# Patient Record
Sex: MALE | Race: BLACK OR AFRICAN AMERICAN | Employment: FULL TIME | URBAN - METROPOLITAN AREA
[De-identification: names, ages, dates, MRNs, and addresses within clinical notes are randomized per-mention and may not be internally consistent; named-entity substitution may affect disease eponyms.]

---

## 2018-03-08 ENCOUNTER — HOSPITAL ENCOUNTER (EMERGENCY)
Age: 48
Discharge: HOME | End: 2018-03-08
Attending: EMERGENCY MEDICINE | Admitting: EMERGENCY MEDICINE
Payer: SELF-PAY

## 2018-03-08 ENCOUNTER — APPOINTMENT (OUTPATIENT)
Dept: GENERAL RADIOLOGY | Age: 48
End: 2018-03-08
Attending: PHYSICIAN ASSISTANT
Payer: SELF-PAY

## 2018-03-08 VITALS
OXYGEN SATURATION: 97 % | SYSTOLIC BLOOD PRESSURE: 151 MMHG | HEART RATE: 71 BPM | RESPIRATION RATE: 16 BRPM | HEIGHT: 71 IN | DIASTOLIC BLOOD PRESSURE: 90 MMHG | BODY MASS INDEX: 25.2 KG/M2 | WEIGHT: 180 LBS | TEMPERATURE: 98.4 F

## 2018-03-08 DIAGNOSIS — J20.9 ACUTE BRONCHITIS, UNSPECIFIED ORGANISM: Primary | ICD-10-CM

## 2018-03-08 PROCEDURE — 71046 X-RAY EXAM CHEST 2 VIEWS: CPT

## 2018-03-08 PROCEDURE — 74011250637 HC RX REV CODE- 250/637: Performed by: PHYSICIAN ASSISTANT

## 2018-03-08 PROCEDURE — 99284 EMERGENCY DEPT VISIT MOD MDM: CPT

## 2018-03-08 RX ORDER — PREDNISONE 5 MG/1
TABLET ORAL
Qty: 21 TAB | Refills: 0 | Status: SHIPPED | OUTPATIENT
Start: 2018-03-08 | End: 2022-10-12

## 2018-03-08 RX ORDER — AMLODIPINE BESYLATE 10 MG/1
10 TABLET ORAL DAILY
Qty: 30 TAB | Refills: 1 | Status: SHIPPED | OUTPATIENT
Start: 2018-03-08 | End: 2022-10-12

## 2018-03-08 RX ORDER — AMLODIPINE BESYLATE 5 MG/1
10 TABLET ORAL
Status: COMPLETED | OUTPATIENT
Start: 2018-03-08 | End: 2018-03-08

## 2018-03-08 RX ORDER — HYDROCHLOROTHIAZIDE 25 MG/1
25 TABLET ORAL
Status: COMPLETED | OUTPATIENT
Start: 2018-03-08 | End: 2018-03-08

## 2018-03-08 RX ORDER — GUAIFENESIN 100 MG/5ML
200 SOLUTION ORAL
Qty: 118 ML | Refills: 0 | Status: SHIPPED | OUTPATIENT
Start: 2018-03-08 | End: 2022-10-12

## 2018-03-08 RX ORDER — AZITHROMYCIN 250 MG/1
TABLET, FILM COATED ORAL
Qty: 6 TAB | Refills: 0 | Status: SHIPPED | OUTPATIENT
Start: 2018-03-08 | End: 2022-10-12

## 2018-03-08 RX ORDER — HYDROCHLOROTHIAZIDE 25 MG/1
25 TABLET ORAL DAILY
Qty: 30 TAB | Refills: 1 | Status: SHIPPED | OUTPATIENT
Start: 2018-03-08 | End: 2022-10-12

## 2018-03-08 RX ORDER — ALBUTEROL SULFATE 90 UG/1
1-2 AEROSOL, METERED RESPIRATORY (INHALATION)
Qty: 1 INHALER | Refills: 1 | Status: SHIPPED | OUTPATIENT
Start: 2018-03-08

## 2018-03-08 RX ORDER — GUAIFENESIN 100 MG/5ML
200 SOLUTION ORAL
Status: COMPLETED | OUTPATIENT
Start: 2018-03-08 | End: 2018-03-08

## 2018-03-08 RX ADMIN — GUAIFENESIN 200 MG: 100 SOLUTION ORAL at 19:03

## 2018-03-08 RX ADMIN — HYDROCHLOROTHIAZIDE 25 MG: 25 TABLET ORAL at 18:47

## 2018-03-08 RX ADMIN — AMLODIPINE BESYLATE 10 MG: 5 TABLET ORAL at 18:46

## 2018-03-08 NOTE — ED PROVIDER NOTES
EMERGENCY DEPARTMENT HISTORY AND PHYSICAL EXAM    Date: 3/8/2018  Patient Name: Larry Piper    History of Presenting Illness     Chief Complaint   Patient presents with    Cough         History Provided By: Patient      HPI: Larry Piper is a 52 y.o. male with a PMH of hypertension and tobacco abuse who presents with acute productive persistent cough x 2 weeks w/ CP on cough. +SOB and wheezing intermittently. Pt endorses \"very bad\" URI 2 weeks ago that has since resolved but the cough has continued to worsen. Denies fever, chills, n/v, sob/wheezing presently, cp at rest, headache, lightheadedness, dizziness, tingling/numbness, sore throat, ear pain, rhinorrhea, nasal congestion. No modifying factors. Pt additionally endorses he has been out of his HTN medications (norvasc and HCTZ) x 3 days. PCP: None    Current Outpatient Prescriptions   Medication Sig Dispense Refill    amLODIPine (NORVASC) 10 mg tablet Take 1 Tab by mouth daily. 30 Tab 1    hydroCHLOROthiazide (HYDRODIURIL) 25 mg tablet Take 1 Tab by mouth daily. 30 Tab 1    guaiFENesin (ROBITUSSIN) 100 mg/5 mL liquid Take 10 mL by mouth three (3) times daily as needed for Cough. 118 mL 0    albuterol (PROVENTIL HFA, VENTOLIN HFA, PROAIR HFA) 90 mcg/actuation inhaler Take 1-2 Puffs by inhalation every four (4) hours as needed for Wheezing. 1 Inhaler 1    azithromycin (ZITHROMAX) 250 mg tablet Take two tablets today then one tablet daily 6 Tab 0    predniSONE (STERAPRED) 5 mg dose pack See administration instruction per 5mg dose pack 21 Tab 0       Past History     Past Medical History:  Past Medical History:   Diagnosis Date    HX OTHER MEDICAL     Hypertension     Other ill-defined conditions(749.19)     kidney stones       Past Surgical History:  History reviewed. No pertinent surgical history. Family History:  History reviewed. No pertinent family history.     Social History:  Social History   Substance Use Topics    Smoking status: Current Every Day Smoker     Packs/day: 0.50     Years: 30.00    Smokeless tobacco: Never Used    Alcohol use No       Allergies:  No Known Allergies      Review of Systems   Review of Systems   Constitutional: Negative for activity change, chills, fatigue and fever. HENT: Negative for congestion, dental problem, drooling, ear discharge, ear pain, facial swelling, hearing loss, nosebleeds, postnasal drip, rhinorrhea, sinus pressure, sore throat, trouble swallowing and voice change. Eyes: Negative. Negative for pain and redness. Respiratory: Positive for cough, shortness of breath and wheezing. Negative for apnea, chest tightness and stridor. Cardiovascular: Positive for chest pain (only with cough). Negative for palpitations and leg swelling. Gastrointestinal: Negative. Negative for abdominal pain, constipation, diarrhea, nausea and vomiting. Genitourinary: Negative. Negative for dysuria. Musculoskeletal: Negative. Negative for myalgias. Skin: Negative. Negative for rash. Neurological: Negative for headaches. Psychiatric/Behavioral: Negative. Negative for confusion. Physical Exam     Vitals:    03/08/18 1812 03/08/18 1846 03/08/18 1847   BP: (!) 180/103 (!) 152/99 (!) 152/98   Pulse: 86 78 78   Resp: 16     Temp: 98.4 °F (36.9 °C)     SpO2: 100%     Weight: 81.6 kg (180 lb)     Height: 5' 11\" (1.803 m)       Physical Exam   Constitutional: He is oriented to person, place, and time. He appears well-developed and well-nourished. No distress. HENT:   Head: Normocephalic and atraumatic. Right Ear: Hearing, tympanic membrane, external ear and ear canal normal.   Left Ear: Hearing, tympanic membrane, external ear and ear canal normal.   Nose: Nose normal. No mucosal edema or rhinorrhea. Right sinus exhibits no maxillary sinus tenderness and no frontal sinus tenderness. Left sinus exhibits no maxillary sinus tenderness and no frontal sinus tenderness.    Mouth/Throat: Uvula is midline, oropharynx is clear and moist and mucous membranes are normal. Mucous membranes are not dry. No trismus in the jaw. No oropharyngeal exudate, posterior oropharyngeal edema, posterior oropharyngeal erythema or tonsillar abscesses. Eyes: Conjunctivae and EOM are normal. Pupils are equal, round, and reactive to light. Neck: Normal range of motion and full passive range of motion without pain. Cardiovascular: Normal rate, regular rhythm, normal heart sounds and intact distal pulses. Exam reveals no gallop and no friction rub. No murmur heard. Pulmonary/Chest: Effort normal and breath sounds normal. No accessory muscle usage. No respiratory distress. He has no decreased breath sounds. He has no wheezes. He has no rhonchi. He has no rales. He exhibits no tenderness. Persistent cough noted in room. Abdominal: Soft. There is no tenderness. Musculoskeletal: Normal range of motion. Neurological: He is alert and oriented to person, place, and time. Skin: Skin is warm, dry and intact. He is not diaphoretic. No pallor. Psychiatric: He has a normal mood and affect. His speech is normal and behavior is normal. Judgment and thought content normal.   Nursing note and vitals reviewed. Diagnostic Study Results     Labs -   No results found for this or any previous visit (from the past 12 hour(s)). Radiologic Studies -   XR CHEST PA LAT   Final Result        CT Results  (Last 48 hours)    None        CXR Results  (Last 48 hours)               03/08/18 1903  XR CHEST PA LAT Final result    Impression:  IMPRESSION: No acute intrathoracic disease. Narrative:  CLINICAL HISTORY: Bodyaches, fever    INDICATION: Cough       COMPARISON: 7/23/2015       FINDINGS:    PA and lateral views of the chest are obtained. The cardiopericardial silhouette is within normal limits. There is no pleural   effusion, pneumothorax or focal consolidation present.                    Medical Decision Making   I am the first provider for this patient. I reviewed the vital signs, available nursing notes, past medical history, past surgical history, family history and social history. Vital Signs-Reviewed the patient's vital signs. Records Reviewed: Nursing Notes and Old Medical Records    ED Course:     Disposition:    DISCHARGE NOTE:   7:34 PM      Care plan outlined and precautions discussed. Patient has no new complaints, changes, or physical findings. Results of xray were reviewed with the patient. All medications were reviewed with the patient; will d/c home with mediations below. All of pt's questions and concerns were addressed. Patient was instructed and agrees to follow up with PCP, as well as to return to the ED upon further deterioration. Patient is ready to go home. Follow-up Information     Follow up With Details Comments [address] [facility] Schedule an appointment as soon as possible for a visit in 1 week As needed, If symptoms worsen [address] [facility] [address]    [facility] Schedule an appointment as soon as possible for a visit in 1 week As needed, If symptoms worsen [place]/ [place]  [id number] [phone]          Current Discharge Medication List      START taking these medications    Details   guaiFENesin (ROBITUSSIN) 100 mg/5 mL liquid Take 10 mL by mouth three (3) times daily as needed for Cough. Qty: 118 mL, Refills: 0      albuterol (PROVENTIL HFA, VENTOLIN HFA, PROAIR HFA) 90 mcg/actuation inhaler Take 1-2 Puffs by inhalation every four (4) hours as needed for Wheezing.   Qty: 1 Inhaler, Refills: 1      azithromycin (ZITHROMAX) 250 mg tablet Take two tablets today then one tablet daily  Qty: 6 Tab, Refills: 0      predniSONE (STERAPRED) 5 mg dose pack See administration instruction per 5mg dose pack  Qty: 21 Tab, Refills: 0         CONTINUE these medications which have CHANGED    Details amLODIPine (NORVASC) 10 mg tablet Take 1 Tab by mouth daily. Qty: 30 Tab, Refills: 1      hydroCHLOROthiazide (HYDRODIURIL) 25 mg tablet Take 1 Tab by mouth daily. Qty: 30 Tab, Refills: 1             Provider Notes (Medical Decision Making):   DDx: uri, post viral cough syndrome, pna, bronchitis, htn, pleural effusion    Procedures:  Procedures        Diagnosis     Clinical Impression:   1.  Acute bronchitis, unspecified organism

## 2018-03-09 NOTE — DISCHARGE INSTRUCTIONS
Bronchitis: Care Instructions  Your Care Instructions    Bronchitis is inflammation of the bronchial tubes, which carry air to the lungs. The tubes swell and produce mucus, or phlegm. The mucus and inflamed bronchial tubes make you cough. You may have trouble breathing. Most cases of bronchitis are caused by viruses like those that cause colds. Antibiotics usually do not help and they may be harmful. Bronchitis usually develops rapidly and lasts about 2 to 3 weeks in otherwise healthy people. Follow-up care is a key part of your treatment and safety. Be sure to make and go to all appointments, and call your doctor if you are having problems. It's also a good idea to know your test results and keep a list of the medicines you take. How can you care for yourself at home? · Take all medicines exactly as prescribed. Call your doctor if you think you are having a problem with your medicine. · Get some extra rest.  · Take an over-the-counter pain medicine, such as acetaminophen (Tylenol), ibuprofen (Advil, Motrin), or naproxen (Aleve) to reduce fever and relieve body aches. Read and follow all instructions on the label. · Do not take two or more pain medicines at the same time unless the doctor told you to. Many pain medicines have acetaminophen, which is Tylenol. Too much acetaminophen (Tylenol) can be harmful. · Take an over-the-counter cough medicine that contains dextromethorphan to help quiet a dry, hacking cough so that you can sleep. Avoid cough medicines that have more than one active ingredient. Read and follow all instructions on the label. · Breathe moist air from a humidifier, hot shower, or sink filled with hot water. The heat and moisture will thin mucus so you can cough it out. · Do not smoke. Smoking can make bronchitis worse. If you need help quitting, talk to your doctor about stop-smoking programs and medicines. These can increase your chances of quitting for good.   When should you call for help? Call 911 anytime you think you may need emergency care. For example, call if:  ? · You have severe trouble breathing. ?Call your doctor now or seek immediate medical care if:  ? · You have new or worse trouble breathing. ? · You cough up dark brown or bloody mucus (sputum). ? · You have a new or higher fever. ? · You have a new rash. ? Watch closely for changes in your health, and be sure to contact your doctor if:  ? · You cough more deeply or more often, especially if you notice more mucus or a change in the color of your mucus. ? · You are not getting better as expected. Where can you learn more? Go to http://allan-colby.info/. Enter H333 in the search box to learn more about \"Bronchitis: Care Instructions. \"  Current as of: May 12, 2017  Content Version: 11.4  © 5396-7406 "[employer] Care instructions adapted under license by [employer] (which disclaims liability or warranty for this information). If you have questions about a medical condition or this instruction, always ask your healthcare professional. [address] any warranty or liability for your use of this information.

## 2019-12-30 ENCOUNTER — HOSPITAL ENCOUNTER (EMERGENCY)
Age: 49
Discharge: HOME | End: 2019-12-30
Attending: EMERGENCY MEDICINE | Admitting: EMERGENCY MEDICINE
Payer: COMMERCIAL

## 2019-12-30 VITALS
TEMPERATURE: 98.7 F | DIASTOLIC BLOOD PRESSURE: 88 MMHG | SYSTOLIC BLOOD PRESSURE: 141 MMHG | OXYGEN SATURATION: 99 % | RESPIRATION RATE: 17 BRPM | HEART RATE: 99 BPM

## 2019-12-30 DIAGNOSIS — R07.9 ACUTE CHEST PAIN: Primary | ICD-10-CM

## 2019-12-30 LAB
ALBUMIN SERPL-MCNC: 3.6 G/DL (ref 3.5–5)
ALBUMIN/GLOB SERPL: 0.8 {RATIO} (ref 1.1–2.2)
ALP SERPL-CCNC: 94 U/L (ref 45–117)
ALT SERPL-CCNC: 26 U/L (ref 12–78)
ANION GAP SERPL CALC-SCNC: 8 MMOL/L (ref 5–15)
AST SERPL-CCNC: 14 U/L (ref 15–37)
BASOPHILS # BLD: 0 K/UL (ref 0–0.1)
BASOPHILS NFR BLD: 0 % (ref 0–1)
BILIRUB SERPL-MCNC: 0.3 MG/DL (ref 0.2–1)
BUN SERPL-MCNC: 17 MG/DL (ref 6–20)
BUN/CREAT SERPL: 16 (ref 12–20)
CALCIUM SERPL-MCNC: 9.2 MG/DL (ref 8.5–10.1)
CHLORIDE SERPL-SCNC: 104 MMOL/L (ref 97–108)
CO2 SERPL-SCNC: 26 MMOL/L (ref 21–32)
COMMENT, HOLDF: NORMAL
CREAT SERPL-MCNC: 1.08 MG/DL (ref 0.7–1.3)
DIFFERENTIAL METHOD BLD: NORMAL
EOSINOPHIL # BLD: 0.2 K/UL (ref 0–0.4)
EOSINOPHIL NFR BLD: 2 % (ref 0–7)
ERYTHROCYTE [DISTWIDTH] IN BLOOD BY AUTOMATED COUNT: 13.4 % (ref 11.5–14.5)
GLOBULIN SER CALC-MCNC: 4.4 G/DL (ref 2–4)
GLUCOSE SERPL-MCNC: 148 MG/DL (ref 65–100)
HCT VFR BLD AUTO: 40.1 % (ref 36.6–50.3)
HGB BLD-MCNC: 13.4 G/DL (ref 12.1–17)
IMM GRANULOCYTES # BLD AUTO: 0 K/UL (ref 0–0.04)
IMM GRANULOCYTES NFR BLD AUTO: 0 % (ref 0–0.5)
LYMPHOCYTES # BLD: 2 K/UL (ref 0.8–3.5)
LYMPHOCYTES NFR BLD: 23 % (ref 12–49)
MCH RBC QN AUTO: 31.5 PG (ref 26–34)
MCHC RBC AUTO-ENTMCNC: 33.4 G/DL (ref 30–36.5)
MCV RBC AUTO: 94.1 FL (ref 80–99)
MONOCYTES # BLD: 0.6 K/UL (ref 0–1)
MONOCYTES NFR BLD: 7 % (ref 5–13)
NEUTS SEG # BLD: 5.9 K/UL (ref 1.8–8)
NEUTS SEG NFR BLD: 68 % (ref 32–75)
NRBC # BLD: 0 K/UL (ref 0–0.01)
NRBC BLD-RTO: 0 PER 100 WBC
PLATELET # BLD AUTO: 227 K/UL (ref 150–400)
PMV BLD AUTO: 10.9 FL (ref 8.9–12.9)
POTASSIUM SERPL-SCNC: 3.1 MMOL/L (ref 3.5–5.1)
PROT SERPL-MCNC: 8 G/DL (ref 6.4–8.2)
RBC # BLD AUTO: 4.26 M/UL (ref 4.1–5.7)
SAMPLES BEING HELD,HOLD: NORMAL
SODIUM SERPL-SCNC: 138 MMOL/L (ref 136–145)
TROPONIN I SERPL-MCNC: <0.05 NG/ML
WBC # BLD AUTO: 8.8 K/UL (ref 4.1–11.1)

## 2019-12-30 PROCEDURE — 84484 ASSAY OF TROPONIN QUANT: CPT

## 2019-12-30 PROCEDURE — 93005 ELECTROCARDIOGRAM TRACING: CPT

## 2019-12-30 PROCEDURE — 99284 EMERGENCY DEPT VISIT MOD MDM: CPT

## 2019-12-30 PROCEDURE — 36415 COLL VENOUS BLD VENIPUNCTURE: CPT

## 2019-12-30 PROCEDURE — 85025 COMPLETE CBC W/AUTO DIFF WBC: CPT

## 2019-12-30 PROCEDURE — 80053 COMPREHEN METABOLIC PANEL: CPT

## 2019-12-30 NOTE — ED TRIAGE NOTES
Denies pain at present. He has his EKG from the doctors office. He says the pain comes and goes and only lasts for a few minutes and radiate down his left arm.

## 2019-12-30 NOTE — DISCHARGE INSTRUCTIONS

## 2019-12-30 NOTE — ED PROVIDER NOTES
HPI.  Patient has hypertension and is compliant with his blood pressure medication. He does not have diabetes and is not aware of having hyperlipidemia. His grandmother had a heart attack when she was in her 62s and his mother had congestive heart failure. Patient is presently smoking 1/2 packs of cigarettes per day. He has had chest pain for several weeks. Pain lasts 1 to 2 seconds at a time. It is over a very small area left upper lateral pectoralis location. Movement, deep breaths, and food do not affect the pain. Pain is not exertional.  Patient is not coughing or having fevers. Past Medical History:   Diagnosis Date    HX OTHER MEDICAL     Hypertension     Other ill-defined conditions(433.33)     kidney stones       History reviewed. No pertinent surgical history. No family history on file.     Social History     Socioeconomic History    Marital status: SINGLE     Spouse name: Not on file    Number of children: Not on file    Years of education: Not on file    Highest education level: Not on file   Occupational History    Not on file   Social Needs    Financial resource strain: Not on file    Food insecurity:     Worry: Not on file     Inability: Not on file    Transportation needs:     Medical: Not on file     Non-medical: Not on file   Tobacco Use    Smoking status: Current Every Day Smoker     Packs/day: 0.50     Years: 30.00     Pack years: 15.00    Smokeless tobacco: Never Used   Substance and Sexual Activity    Alcohol use: No    Drug use: Yes     Frequency: 7.0 times per week     Types: Heroin    Sexual activity: Yes     Partners: Female     Birth control/protection: Condom     Comment: Yesterday   Lifestyle    Physical activity:     Days per week: Not on file     Minutes per session: Not on file    Stress: Not on file   Relationships    Social connections:     Talks on phone: Not on file     Gets together: Not on file     Attends Confucianist service: Not on file Active member of club or organization: Not on file     Attends meetings of clubs or organizations: Not on file     Relationship status: Not on file    Intimate partner violence:     Fear of current or ex partner: Not on file     Emotionally abused: Not on file     Physically abused: Not on file     Forced sexual activity: Not on file   Other Topics Concern    Not on file   Social History Narrative    Not on file         ALLERGIES: Patient has no known allergies. Review of Systems   Constitutional: Negative for activity change and appetite change. HENT: Negative for trouble swallowing. Eyes: Negative for redness. Respiratory: Negative for chest tightness and wheezing. Cardiovascular: Positive for chest pain. Negative for leg swelling. Gastrointestinal: Negative for abdominal distention and abdominal pain. Genitourinary: Positive for difficulty urinating. Musculoskeletal: Negative for back pain, gait problem and neck pain. Neurological: Negative for dizziness, weakness and numbness. Psychiatric/Behavioral: Negative for agitation and behavioral problems. The patient is nervous/anxious. Vitals:    12/30/19 1654   BP: (!) 146/94   Pulse: (!) 110   Resp: 16   Temp: 98.5 °F (36.9 °C)   SpO2: 98%            Physical Exam  Vitals signs and nursing note reviewed. Constitutional:       Appearance: He is well-developed. HENT:      Head: Normocephalic and atraumatic. Eyes:      Pupils: Pupils are equal, round, and reactive to light. Neck:      Musculoskeletal: Normal range of motion and neck supple. Cardiovascular:      Rate and Rhythm: Normal rate and regular rhythm. Heart sounds: Normal heart sounds. No murmur. No friction rub. No gallop. Pulmonary:      Effort: Pulmonary effort is normal. No respiratory distress. Breath sounds: No wheezing or rales. Abdominal:      Palpations: Abdomen is soft. Tenderness: There is no tenderness. There is no rebound. Musculoskeletal: Normal range of motion. General: No tenderness. Skin:     Findings: No erythema. Neurological:      Mental Status: He is alert. Cranial Nerves: No cranial nerve deficit. Comments: Motor; symmetric   Psychiatric:         Behavior: Behavior normal.          MDM       Procedures          ED EKG interpretation:  Rhythm: normal sinus rhythm; and regular. Rate (approx.): 95; Axis: normal; P wave: normal; QRS interval: normal ; ST/T wave: normal; in  Lead: ; Other findings: left ventricular hypertrophy. This EKG was interpreted by Shira Alegre MD,ED Provider.   6:32 PM

## 2019-12-30 NOTE — ED TRIAGE NOTES
EMS note: Patient presents from doctors office with complaints of generalized chest pain for the past 2 weeks. Patient reports weight gain over the last 2 weeks. Patient denies any significant cardiac history.  Patient reports he has had SOB on exertion in the past

## 2019-12-31 LAB
ATRIAL RATE: 96 BPM
CALCULATED P AXIS, ECG09: 65 DEGREES
CALCULATED R AXIS, ECG10: -17 DEGREES
CALCULATED T AXIS, ECG11: 27 DEGREES
DIAGNOSIS, 93000: NORMAL
P-R INTERVAL, ECG05: 190 MS
Q-T INTERVAL, ECG07: 372 MS
QRS DURATION, ECG06: 88 MS
QTC CALCULATION (BEZET), ECG08: 469 MS
VENTRICULAR RATE, ECG03: 96 BPM

## 2019-12-31 NOTE — ED NOTES
Pt given discharge instructions, patient education and follow-up information by provider. Pt states understanding - all questions answered. Pt discharged to home in private vehicle, ambulatory. Pt A&Ox4, RA, with pain controlled. PIV discontinued with tip intact.

## 2021-12-17 ENCOUNTER — APPOINTMENT (OUTPATIENT)
Dept: GENERAL RADIOLOGY | Age: 51
End: 2021-12-17
Attending: EMERGENCY MEDICINE
Payer: COMMERCIAL

## 2021-12-17 ENCOUNTER — HOSPITAL ENCOUNTER (EMERGENCY)
Age: 51
Discharge: HOME | End: 2021-12-18
Attending: EMERGENCY MEDICINE
Payer: COMMERCIAL

## 2021-12-17 DIAGNOSIS — R07.89 OTHER CHEST PAIN: Primary | ICD-10-CM

## 2021-12-17 LAB
ALBUMIN SERPL-MCNC: 3.8 G/DL (ref 3.5–5)
ALBUMIN/GLOB SERPL: 0.8 {RATIO} (ref 1.1–2.2)
ALP SERPL-CCNC: 112 U/L (ref 45–117)
ALT SERPL-CCNC: 53 U/L (ref 12–78)
ANION GAP SERPL CALC-SCNC: 10 MMOL/L (ref 5–15)
AST SERPL-CCNC: 29 U/L (ref 15–37)
BASOPHILS # BLD: 0 K/UL (ref 0–0.1)
BASOPHILS NFR BLD: 1 % (ref 0–1)
BILIRUB SERPL-MCNC: 0.3 MG/DL (ref 0.2–1)
BUN SERPL-MCNC: 25 MG/DL (ref 6–20)
BUN/CREAT SERPL: 20 (ref 12–20)
CALCIUM SERPL-MCNC: 10.1 MG/DL (ref 8.5–10.1)
CHLORIDE SERPL-SCNC: 101 MMOL/L (ref 97–108)
CO2 SERPL-SCNC: 20 MMOL/L (ref 21–32)
COMMENT, HOLDF: NORMAL
CREAT SERPL-MCNC: 1.27 MG/DL (ref 0.7–1.3)
DIFFERENTIAL METHOD BLD: NORMAL
EOSINOPHIL # BLD: 0.1 K/UL (ref 0–0.4)
EOSINOPHIL NFR BLD: 1 % (ref 0–7)
ERYTHROCYTE [DISTWIDTH] IN BLOOD BY AUTOMATED COUNT: 13.6 % (ref 11.5–14.5)
GLOBULIN SER CALC-MCNC: 4.6 G/DL (ref 2–4)
GLUCOSE SERPL-MCNC: 264 MG/DL (ref 65–100)
HCT VFR BLD AUTO: 39.4 % (ref 36.6–50.3)
HGB BLD-MCNC: 13 G/DL (ref 12.1–17)
IMM GRANULOCYTES # BLD AUTO: 0 K/UL (ref 0–0.04)
IMM GRANULOCYTES NFR BLD AUTO: 0 % (ref 0–0.5)
LYMPHOCYTES # BLD: 2.8 K/UL (ref 0.8–3.5)
LYMPHOCYTES NFR BLD: 34 % (ref 12–49)
MCH RBC QN AUTO: 29.7 PG (ref 26–34)
MCHC RBC AUTO-ENTMCNC: 33 G/DL (ref 30–36.5)
MCV RBC AUTO: 90 FL (ref 80–99)
MONOCYTES # BLD: 0.5 K/UL (ref 0–1)
MONOCYTES NFR BLD: 6 % (ref 5–13)
NEUTS SEG # BLD: 4.8 K/UL (ref 1.8–8)
NEUTS SEG NFR BLD: 58 % (ref 32–75)
NRBC # BLD: 0 K/UL (ref 0–0.01)
NRBC BLD-RTO: 0 PER 100 WBC
PLATELET # BLD AUTO: 186 K/UL (ref 150–400)
PMV BLD AUTO: 11.6 FL (ref 8.9–12.9)
POTASSIUM SERPL-SCNC: 3.9 MMOL/L (ref 3.5–5.1)
PROT SERPL-MCNC: 8.4 G/DL (ref 6.4–8.2)
RBC # BLD AUTO: 4.38 M/UL (ref 4.1–5.7)
SAMPLES BEING HELD,HOLD: NORMAL
SODIUM SERPL-SCNC: 131 MMOL/L (ref 136–145)
TROPONIN-HIGH SENSITIVITY: 20 NG/L (ref 0–76)
WBC # BLD AUTO: 8.2 K/UL (ref 4.1–11.1)

## 2021-12-17 PROCEDURE — 74011250637 HC RX REV CODE- 250/637: Performed by: EMERGENCY MEDICINE

## 2021-12-17 PROCEDURE — 93005 ELECTROCARDIOGRAM TRACING: CPT

## 2021-12-17 PROCEDURE — 83690 ASSAY OF LIPASE: CPT

## 2021-12-17 PROCEDURE — 80053 COMPREHEN METABOLIC PANEL: CPT

## 2021-12-17 PROCEDURE — 84484 ASSAY OF TROPONIN QUANT: CPT

## 2021-12-17 PROCEDURE — 36415 COLL VENOUS BLD VENIPUNCTURE: CPT

## 2021-12-17 PROCEDURE — 74011250636 HC RX REV CODE- 250/636: Performed by: EMERGENCY MEDICINE

## 2021-12-17 PROCEDURE — 85025 COMPLETE CBC W/AUTO DIFF WBC: CPT

## 2021-12-17 PROCEDURE — 74011000250 HC RX REV CODE- 250: Performed by: EMERGENCY MEDICINE

## 2021-12-17 PROCEDURE — 71046 X-RAY EXAM CHEST 2 VIEWS: CPT

## 2021-12-17 PROCEDURE — 99285 EMERGENCY DEPT VISIT HI MDM: CPT

## 2021-12-17 RX ORDER — GUAIFENESIN 100 MG/5ML
325 LIQUID (ML) ORAL
Status: COMPLETED | OUTPATIENT
Start: 2021-12-17 | End: 2021-12-17

## 2021-12-17 RX ADMIN — Medication 40 ML: at 23:16

## 2021-12-17 RX ADMIN — SODIUM CHLORIDE 1000 ML: 9 INJECTION, SOLUTION INTRAVENOUS at 23:19

## 2021-12-17 RX ADMIN — ASPIRIN 81 MG CHEWABLE TABLET 324 MG: 81 TABLET CHEWABLE at 23:16

## 2021-12-17 NOTE — ED PROVIDER NOTES
Please note that this dictation was completed with [employer], the computer voice recognition software.  Quite often unanticipated grammatical, syntax, homophones, and other interpretive errors are inadvertently transcribed by the computer software.  Please disregard these errors.  Please excuse any errors that have escaped final proofreading. 54-year-old male past medical history markable for hypertension and kidney stones presents the ER POV complaining of \" cough and for 3 weeks. Productive of clear white sputum. No blood. Cough seems worse at night. Also been having chest pain. Is substernally located achiness worse with increased coughing. The chest pain is also worse at night. The chest pain is unchanged with exertion eating or drinking foods. There is been no episodes of breaking into sweats extreme shortness of breath fevers chills vomiting diarrhea. Patient states been going to the bathroom normally. He denies any previous history of heart problems has never seen a cardiologist in the past.  He has never had this type of pain in the past.  Patient denies other current systemic complaints is driven to the ER today for further evaluation by his wife. pt denies HA, vison changes, diff swallowing, SOB, Abd pain, F/Ch, N/V, D/Cons or other current systemic complaints    Social/ PSH reviewed in EMR    EMR Chart Reviewed           Past Medical History:   Diagnosis Date    HX OTHER MEDICAL     Hypertension     Other ill-defined conditions(581.06)     kidney stones       No past surgical history on file. No family history on file.     Social History     Socioeconomic History    Marital status: SINGLE     Spouse name: Not on file    Number of children: Not on file    Years of education: Not on file    Highest education level: Not on file   Occupational History    Not on file   Tobacco Use    Smoking status: Current Every Day Smoker     Packs/day: 0.50     Years: 30.00     Pack years: 15.00  Smokeless tobacco: Never Used   Substance and Sexual Activity    Alcohol use: No    Drug use: Yes     Frequency: 7.0 times per week     Types: Heroin    Sexual activity: Yes     Partners: Female     Birth control/protection: Condom     Comment: Yesterday   Other Topics Concern    Not on file   Social History Narrative    Not on file     Social Determinants of Health     Financial Resource Strain:     Difficulty of Paying Living Expenses: Not on file   Food Insecurity:     Worried About Running Out of Food in the Last Year: Not on file    Pati of Food in the Last Year: Not on file   Transportation Needs:     Lack of Transportation (Medical): Not on file    Lack of Transportation (Non-Medical): Not on file   Physical Activity:     Days of Exercise per Week: Not on file    Minutes of Exercise per Session: Not on file   Stress:     Feeling of Stress : Not on file   Social Connections:     Frequency of Communication with Friends and Family: Not on file    Frequency of Social Gatherings with Friends and Family: Not on file    Attends Mandaen Services: Not on file    Active Member of [address] [place] [address] or Organizations: Not on file    Attends Club or Organization Meetings: Not on file    Marital Status: Not on file   Intimate Partner Violence:     Fear of Current or Ex-Partner: Not on file    Emotionally Abused: Not on file    Physically Abused: Not on file    Sexually Abused: Not on file   Housing Stability:     Unable to Pay for Housing in the Last Year: Not on file    Number of [place] in the Last Year: Not on file    Unstable Housing in the Last Year: Not on file         ALLERGIES: Patient has no known allergies. Review of Systems   Constitutional: Negative for chills and fever. HENT: Negative for drooling, trouble swallowing and voice change. Eyes: Negative for photophobia. Respiratory: Positive for cough and chest tightness. Negative for shortness of breath, wheezing and stridor. Cardiovascular: Positive for chest pain. Negative for palpitations and leg swelling. Gastrointestinal: Negative for abdominal pain, constipation, diarrhea, nausea and vomiting. Genitourinary: Negative for dysuria. Musculoskeletal: Negative for back pain. Skin: Negative for rash. Neurological: Negative for facial asymmetry and speech difficulty. Psychiatric/Behavioral: Negative for confusion. All other systems reviewed and are negative. Vitals:    12/17/21 1756   BP: (!) 150/88   Pulse: (!) 131   Resp: 18   Temp: 98.5 °F (36.9 °C)   SpO2: 98%            Physical Exam  Vitals and nursing note reviewed. Constitutional:       General: He is not in acute distress. Appearance: Normal appearance. He is well-developed. He is not ill-appearing, toxic-appearing or diaphoretic. Comments: NAD, AxOx4, speaking in complete sentences       HENT:      Head: Normocephalic and atraumatic. Right Ear: External ear normal.      Left Ear: External ear normal.      Mouth/Throat:      Pharynx: No oropharyngeal exudate or posterior oropharyngeal erythema. Eyes:      General:         Right eye: No discharge. Left eye: No discharge. Extraocular Movements: Extraocular movements intact. Conjunctiva/sclera: Conjunctivae normal.      Pupils: Pupils are equal, round, and reactive to light. Cardiovascular:      Rate and Rhythm: Normal rate and regular rhythm. Pulses: Normal pulses. Heart sounds: Normal heart sounds. No murmur heard. No friction rub. No gallop. Pulmonary:      Effort: Pulmonary effort is normal. No respiratory distress. Breath sounds: Normal breath sounds. No stridor. No wheezing, rhonchi or rales. Chest:      Chest wall: No tenderness. Abdominal:      General: Bowel sounds are normal. There is no distension. Palpations: Abdomen is soft. There is no mass. Tenderness: There is no abdominal tenderness. There is no guarding or rebound. Hernia: No hernia is present. Musculoskeletal:         General: No deformity or signs of injury. Normal range of motion. Cervical back: Normal range of motion and neck supple. Right lower leg: No edema. Left lower leg: No edema. Lymphadenopathy:      Cervical: No cervical adenopathy. Skin:     General: Skin is warm and dry. Capillary Refill: Capillary refill takes less than 2 seconds. Findings: No bruising, erythema or rash. Neurological:      General: No focal deficit present. Mental Status: He is alert and oriented to person, place, and time. Cranial Nerves: No cranial nerve deficit. Sensory: No sensory deficit. Motor: No weakness. Coordination: Coordination normal.      Gait: Gait normal.      Deep Tendon Reflexes: Reflexes normal.          MDM       Procedures    Chief Complaint   Patient presents with    Cough    Chest Pain       6:25 PM  The patients presenting problems have been discussed, and they are in agreement with the care plan formulated and outlined with them. I have encouraged them to ask questions as they arise throughout their visit. MEDICATIONS GIVEN:  Medications - No data to display    LABS REVIEWED:  Labs Reviewed   CBC WITH AUTOMATED DIFF   METABOLIC PANEL, COMPREHENSIVE   TROPONIN-HIGH SENSITIVITY   SAMPLES BEING HELD       RADIOLOGY RESULTS:  The following have been ordered and reviewed:  _____________________________________________________________________  _____________________________________________________________________    EKG interpretation:   Rhythm: sinus tachycardia rhythm; and regular . Rate (approx.): 118; Axis: normal; P wave: normal; QRS interval: normal ; ST/T wave: normal; Negative acute significant segmental elevations/ unchanged compared to study dated 12/30/2019 save increased rate;       PROCEDURES:        CONSULTATIONS:       PROGRESS NOTES:      DIAGNOSIS:    No diagnosis found.     PLAN:  1-      ED COURSE: The patients hospital course has been uncomplicated. 11:00 PM  Patient told of the results thus far agrees with repeat troponin. 11:15 PM  Change of shift. Care of patient signed over to Dr Parag Pascual. Bedside handoff complete. Awaiting Repeat trop results; Mely Pearceore

## 2021-12-18 VITALS
RESPIRATION RATE: 16 BRPM | BODY MASS INDEX: 29.12 KG/M2 | HEART RATE: 96 BPM | DIASTOLIC BLOOD PRESSURE: 75 MMHG | OXYGEN SATURATION: 95 % | TEMPERATURE: 98.4 F | WEIGHT: 208 LBS | HEIGHT: 71 IN | SYSTOLIC BLOOD PRESSURE: 118 MMHG

## 2021-12-18 LAB
ATRIAL RATE: 103 BPM
ATRIAL RATE: 118 BPM
CALCULATED P AXIS, ECG09: 62 DEGREES
CALCULATED P AXIS, ECG09: 65 DEGREES
CALCULATED R AXIS, ECG10: -30 DEGREES
CALCULATED R AXIS, ECG10: -32 DEGREES
CALCULATED T AXIS, ECG11: 41 DEGREES
CALCULATED T AXIS, ECG11: 69 DEGREES
DIAGNOSIS, 93000: NORMAL
DIAGNOSIS, 93000: NORMAL
LIPASE SERPL-CCNC: 112 U/L (ref 73–393)
P-R INTERVAL, ECG05: 164 MS
P-R INTERVAL, ECG05: 164 MS
Q-T INTERVAL, ECG07: 332 MS
Q-T INTERVAL, ECG07: 362 MS
QRS DURATION, ECG06: 106 MS
QRS DURATION, ECG06: 110 MS
QTC CALCULATION (BEZET), ECG08: 465 MS
QTC CALCULATION (BEZET), ECG08: 474 MS
TROPONIN-HIGH SENSITIVITY: 18 NG/L (ref 0–76)
VENTRICULAR RATE, ECG03: 103 BPM
VENTRICULAR RATE, ECG03: 118 BPM

## 2021-12-18 NOTE — DISCHARGE INSTRUCTIONS
Thank you for allowing us to provide you with medical care today. We realize that you have many choices for your emergency care needs. We thank you for choosing [facility].  Please choose us in the future for any continued health care needs. We hope we addressed all of your medical concerns. We strive to provide excellent quality care in the Emergency Department. Anything less than excellent does not meet our expectations. The exam and treatment you received in the Emergency Department were for an emergent problem and are not intended as complete care. It is important that you follow up with a doctor, nurse practitioner, or [address] [place] [address] assistant for ongoing care. If your symptoms worsen or you do not improve as expected and you are unable to reach your usual health care provider, you should return to the Emergency Department. We are available 24 hours a day. Take this sheet with you when you go to your follow-up visit. If you have any problem arranging the follow-up visit, contact the Emergency Department immediately. Make an appointment your family doctor for follow up of this visit. Return to the ER if you are unable to be seen in a timely manner.

## 2022-03-09 ENCOUNTER — TRANSCRIBE ORDER (OUTPATIENT)
Dept: SCHEDULING | Age: 52
End: 2022-03-09

## 2022-03-09 DIAGNOSIS — E11.8 TYPE II DIABETES MELLITUS WITH MANIFESTATIONS (HCC): Primary | ICD-10-CM

## 2022-03-11 ENCOUNTER — TRANSCRIBE ORDER (OUTPATIENT)
Dept: SCHEDULING | Age: 52
End: 2022-03-11

## 2022-03-11 DIAGNOSIS — I10 HTN (HYPERTENSION): ICD-10-CM

## 2022-03-11 DIAGNOSIS — R05.3 CHRONIC COUGH: Primary | ICD-10-CM

## 2022-10-12 ENCOUNTER — OFFICE VISIT (OUTPATIENT)
Dept: ENDOCRINOLOGY | Age: 52
End: 2022-10-12
Payer: COMMERCIAL

## 2022-10-12 VITALS
HEIGHT: 71 IN | SYSTOLIC BLOOD PRESSURE: 155 MMHG | DIASTOLIC BLOOD PRESSURE: 93 MMHG | WEIGHT: 199.8 LBS | HEART RATE: 107 BPM | BODY MASS INDEX: 27.97 KG/M2

## 2022-10-12 DIAGNOSIS — E11.65 TYPE 2 DIABETES MELLITUS WITH HYPERGLYCEMIA, WITHOUT LONG-TERM CURRENT USE OF INSULIN (HCC): Primary | ICD-10-CM

## 2022-10-12 DIAGNOSIS — E11.65 TYPE 2 DIABETES MELLITUS WITH HYPERGLYCEMIA, WITHOUT LONG-TERM CURRENT USE OF INSULIN (HCC): ICD-10-CM

## 2022-10-12 LAB — HBA1C MFR BLD HPLC: 12.9 %

## 2022-10-12 PROCEDURE — 83036 HEMOGLOBIN GLYCOSYLATED A1C: CPT | Performed by: GENERAL ACUTE CARE HOSPITAL

## 2022-10-12 PROCEDURE — 99204 OFFICE O/P NEW MOD 45 MIN: CPT | Performed by: GENERAL ACUTE CARE HOSPITAL

## 2022-10-12 RX ORDER — METFORMIN HYDROCHLORIDE 500 MG/1
TABLET ORAL
COMMUNITY
End: 2022-10-12 | Stop reason: ALTCHOICE

## 2022-10-12 RX ORDER — ROSUVASTATIN CALCIUM 10 MG/1
TABLET, COATED ORAL
COMMUNITY

## 2022-10-12 RX ORDER — CHLORTHALIDONE 25 MG/1
TABLET ORAL
COMMUNITY

## 2022-10-12 RX ORDER — OLMESARTAN MEDOXOMIL 40 MG/1
TABLET ORAL
COMMUNITY

## 2022-10-12 RX ORDER — GLIPIZIDE 10 MG/1
10 TABLET ORAL 2 TIMES DAILY
Qty: 60 TABLET | Refills: 3 | Status: SHIPPED | OUTPATIENT
Start: 2022-10-12

## 2022-10-12 RX ORDER — INSULIN PUMP SYRINGE, 3 ML
EACH MISCELLANEOUS
Qty: 1 KIT | Refills: 0 | Status: SHIPPED | OUTPATIENT
Start: 2022-10-12

## 2022-10-12 RX ORDER — LANCETS
EACH MISCELLANEOUS
Qty: 1 EACH | Refills: 11 | Status: SHIPPED | OUTPATIENT
Start: 2022-10-12

## 2022-10-12 RX ORDER — METFORMIN HYDROCHLORIDE 1000 MG/1
1000 TABLET ORAL 2 TIMES DAILY WITH MEALS
Qty: 60 TABLET | Refills: 5 | Status: SHIPPED | OUTPATIENT
Start: 2022-10-12

## 2022-10-12 RX ORDER — LANCING DEVICE
EACH MISCELLANEOUS
Qty: 100 STRIP | Refills: 5 | Status: SHIPPED | OUTPATIENT
Start: 2022-10-12

## 2022-10-12 RX ORDER — ASPIRIN 81 MG/1
TABLET ORAL
COMMUNITY

## 2022-10-12 NOTE — PATIENT INSTRUCTIONS
Diabetes Education referral made: Call them for Appt  [facility] for [address], [address] [address]  Phone [phone]  Fax [phone]     Plan to repeat your diabetes eye exam in the near future. Please be sure to have the eye clinic / office fax us the report of your latest eye exam to our clinic to fax # [phone]. This is very important for us to keep track of any effect of diabetes on your vision as part of our wholesome diabetes care that we provide. INFORMATION FOR NEW DIABETES PATIENTS NOT ON INSULIN:    I would like to welcome you to our Diabetes & Endocrinology clinic. We want to do our best to help you take the best care of your diabetes. I would like for you to read this fact sheet which will have some important information for you regarding your treatment. What is my HbA1c (hemoglobin A1c) ? Blood sugar is very sticky and if left elevated for long enough, it will stick to just about everything in your body. This includes enzymes which can no longer function properly and the lining of your blood vessels which can get damaged and result in damaged organs. It also sticks to your hemoglobin when your red blood cells are being produced and measuring this can provide us with a good idea of what your blood sugar average has been over the past 3 months. Most of the time we aim for a HbA1c value of 7% but this can be different for certain people under certain circumstances. Why do I need to keep a glucose log ? It is not possible to properly make changes to your insulin dose unless we know what your glucose values are at home. Using your HbA1c we can only have a general idea of whether your glucose has been controlled or uncontrolled, but it will not inform us on your day-to-day and [id number] blood sugar control.  If you present to clinic without your glucose log, you may be wasting your visit rather than having a more meaningful visit since medication adjustments will be limited. What other things should I do to always be prepared ? Glucose tablets. Thats right, if you are on a medication that can potentially cause low blood sugars, you need to be prepared just in case since it can cause you to have very uncomfortable symptoms. Generally it is a good idea to keep some in your car, in your bedroom, and at work/school just in case. If your not sure if your diabetes medication can cause low blood sugar, be sure to ask your doctor. Diabetes ID. It is important for others to know that you are diabetic (especially if you are using in insulin) in case for some reason you are not able to communicate with others. This can happen if you pass out due to severely low blood sugar for example. IDs come as bracelets, necklaces, and dog tags. Check with your pharmacy about obtaining an ID and wear it wherever you go. I look forward to working with you,    Cheo Morris MD   [address] [place] [address]    . ............................................................................................................................................ PLAN FOR TODAY    We will plan to make the following changes to your diabetes medications:  Increase metformin to 1000mg twice daily (take with breakfast and dinner)  Start Glipizide 10mg twice daily (take 30 mins before breakfast and dinner)  Start Ozempic 0.25mg weekly, after 4 weeks if tolerated increase to 0.5mg weekly    STOP REGULAR SODA    It will be important to continue checking your glucose just as you did previously. I would like you at the very least to check you glucose during:     + AM fasting before breakfast   + Dinner time   + Bedtime   + Any other time that you are not feeling well. Always provide a glucose log that is completed at every visit so that we can review the results of your home glucose together.  Without this, it is not possible to make accurate changes to your insulin doses. Diabetes and Meal Planning    Meal planning can be a key part of managing diabetes. Planning meals and snacks with the right balance of carbohydrate, protein, and fat can help you keep your blood sugar at the target level. You don't have to eat special foods. You can eat what your family eats, including sweets once in a while. But you do have to pay attention to how often you eat and how much you eat of certain foods. Your plate  The plate format is a simple way to help you manage how you eat. You plan meals by learning how much space each food should take on a plate. It can make it easier to keep your blood sugar level within your target range. It also helps you see if you're eating healthy portion sizes. To use the plate format, you put non-starchy vegetables on half your plate. Add lean protein foods, such as fish, lean meats and poultry, or soy products, on one-quarter of the plate. Put a grain or starchy vegetable (such as brown rice or a potato) on the final quarter of the plate. You can add a small piece of fruit and some low-fat or fat-free milk or yogurt, depending on your carbohydrate goal for each meal.  Make sure that you are not using an oversized plate. A 9-inch plate is best.    Carbohydrates  Carbohydrate raises blood sugar higher and more quickly than any other nutrient. It is found in desserts, breads and cereals, and fruit. It's also found in starchy vegetables such as potatoes and corn, grains such as rice and pasta, and milk and yogurt. You can help keep your blood sugar levels within your target range by planning how much carbohydrate to have at meals and snacks. The amount you need depends on several things. These include your weight, how active you are, which diabetes medicines you take, and what your goals are for your blood sugar levels.    An example of a carbohydrate counting plan is:  45 to 60 grams at each meal. That's about the same as 3 to 4 carbohydrate servings. 15 to 20 grams at each snack. That's about the same as 1 carbohydrate serving. The Nutrition Facts label on packaged foods tells you how much carbohydrate is in a serving of the food. First, look at the serving size on the food label. All of the nutrition information on a food label is based on that serving size. For foods that don't come with labels, such as fresh fruits and vegetables, you'll need a guide that lists carbohydrate in these foods. You may use an tanisha on your smart phone called [employer]. How can you plan healthy meals? Here are some tips to get started:  Plan your meals a week at a time. Don't forget to include snacks too. Use cookbooks or online recipes to plan several main meals. Plan some quick meals for busy nights. You also can double some recipes that freeze well. Then you can save half for other busy nights when you don't have time to cook. Make sure you have the ingredients you need for your recipes. If you're running low on basic items, put these items on your shopping list too. List foods that you use to make breakfasts, lunches, and snacks. List plenty of fruits and vegetables. Post this list on the refrigerator. Add to it as you think of more things you need. Take the list to the store to do your weekly shopping. Follow-up care is a key part of your treatment and safety. Be sure to make and go to all appointments, and call your doctor if you are having problems. It's also a good idea to know your test results and keep a list of the medicines you take.    --------------------------------------------------------------------------------------------------------------------------------------------------------------------------------  Diabetes Dental Care  When you have diabetes, managing blood sugar levels and taking good care of your teeth and gums are both important.  When blood sugar levels are high, there's a greater risk for Gum (periodontal) disease. Tooth decay. Fungal infections in the mouth, like thrush. Dry mouth. Keeping your blood sugar levels in your target range can help prevent problems with the teeth and gums. If you have any problems with your teeth or gums, it is important see your dentist.  How do you care for your teeth and gums when you have diabetes? Brush your teeth twice a day. Floss daily. Make sure to press the floss against your teeth and not your gums. Check each day for areas where your gums might be red or painful. Be sure to let your dentist know of any sores in your mouth. See your dentist regularly for professional cleaning of your teeth and to look for gum problems. Many dentists recommend getting checkups twice a year. Remind your dentist that you have diabetes before any work is done. Don't smoke or use smokeless tobacco.    --------------------------------------------------------------------------------------------------------------------------------------------------------------------------------  Diabetes Foot Care    When you have diabetes, your feet need extra care and attention. Diabetes can damage the nerve endings and blood vessels in your feet, making you less likely to notice when your feet are injured. Diabetes also limits your body's ability to fight infection. If you get a minor foot injury, it could become an ulcer or a serious infection. With good foot care, you can prevent most of these problems. Caring for your feet can be quick and easy. Most of the care can be done when you are bathing or getting ready for bed. Keep your blood sugar close to normal by watching what and how much you eat, monitoring blood sugar, taking medicines if prescribed, and getting regular exercise. Do not smoke. Smoking affects blood flow and can make foot problems worse. Eat a diet that is low in fats. High fat intake can cause fat to build up in your blood vessels and decrease blood flow.   Inspect your feet daily for blisters, cuts, cracks, or sores. If you cannot see well, use a mirror or have someone help you. Take care of your feet:  Wash your feet every day. Use warm (not hot) water. Check the water temperature with your wrists or other part of your body, not your feet. Dry your feet well. If the skin on your feet stays moist, bacteria or a fungus can grow, which can lead to infection. Use moisturizing skin cream to keep the skin on your feet soft and prevent calluses and cracks. Stop using any cream that causes a rash. Clean underneath your toenails carefully. Do not use a sharp object to clean underneath your toenails. Change socks daily. Look inside your shoes every day for things like gravel or torn linings, which could cause blisters or sores. Buy shoes that fit well but not too tightly to prevent bunions and blisters. Shoes should be flexible and breathable but prevent from injury. Do not go barefoot, especially at night, to prevent injury. Do not try to treat an early foot problem at home. Home remedies or treatments that you can buy without a prescription (such as corn removers) can be harmful. Seek immediate help if:   You have a foot sore, an ulcer or break in the skin that is not healing after 4 days, bleeding corns or calluses, or an ingrown toenail. You have blue or black areas. You have peeling skin or tiny blisters between your toes or cracking or oozing of the skin. You have a fever for more than 24 hours and a foot sore. You have new numbness or tingling in your feet that does not go away after you move your feet or change positions. You have new unexplained or unusual swelling of the foot or ankle.

## 2022-10-12 NOTE — PROGRESS NOTES
REFERRED BY: Lupe Cazares MD     REASON:  Uncontrolled type 2 diabetes mellitus    CHIEF COMPLAINT: evaluation of type 2 diabetes mellitus    HISTORY OF PRESENT ILLNESS:   Mary Wick is a 46 y.o. male with a PMHx as noted below who presents for evaluation of uncontrolled type 2 diabetes.   Was following with PCP Dr Erna Fontana    Diabetes History:  Diabetes was diagnosed: 2 years, was prediabetic before that 2-3 yrs  Family History of diabetes is Positive mother DM2  Last A1c prior to initial visit was: 12.9% 10/12/2022    Diabetes-related Hospitalizations: never    Current Home Regimen:  Metformin 500mg BID    Review of home glucose:  Not checking     Hypoglycemia:no    Diet:  -3 meals  Breakfast = boiled eggs 2 slices toast, orange 8 oz with sugar, banana  Lunch = salad, stopped eating fried foods hamburger/cheese burgers for approx 4 weeks  Dinner = everything baked, chicken pork chops, spaghetti, rice, mac and cheese, potatoes, corn, string beans, yams  Snacks = does not snack, occass potato chips  Shauna = orange juice, regular sodas 16 oz x2 bottles per day    Physical Activity:  -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  -[employer] lifestyle, taking [employer]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, 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    Complications:    MI or CVA:No  PVD: No  Retinopathy:No     Last Ophthalm:every year has appt on Oct, last 10/2021, has yet to make an appointment   Nephropathy:No  Neuropathy:No      Last Podiatry:says his toes feel more swollen than before for past 2 weeks   Gastropathy: No  Amputations: No      On a Statin:Yes rosuvastatin 10mg daily  On an ACEI/ARB:Yes olmesartan 40 mg daily  On Aspirin:Yes  Smoker:No    Diabetes Education:denies        Review of most recent diabetes-related labs:  Lab Results   Component Value Date    GFRAA >60 12/17/2021    GFRNA 60 (L) 12/17/2021     Lab Key:  [id number] = IA-2 pancreatic islet cell autoantibody  CPEPL = C-peptide level  :EXT = External Lab  GADLT = KELLEN-65 autoantibody   INSUL = Insulin level  MCACR (or MALBEXT) = Urine Microalbumin (or External UM)  B12LT = B12 level    PAST MEDICAL/SURGICAL HISTORY:   Past Medical History:   Diagnosis Date    HX OTHER MEDICAL     Hypertension     Other ill-defined conditions(799.89)     kidney stones     No past surgical history on file. ALLERGIES:   No Known Allergies    MEDICATIONS ON ADMISSION:     Current Outpatient Medications:     metFORMIN (GLUCOPHAGE) 500 mg tablet, metformin 500 mg tablet  Take 1 tablet every 12 hours by oral route before meals. , Disp: , Rfl:     aspirin delayed-release 81 mg tablet, aspirin 81 mg tablet,delayed release  TAKE 1 TABLET BY MOUTH ONCE DAILY, Disp: , Rfl:     olmesartan (BENICAR) 40 mg tablet, olmesartan 40 mg tablet  Take 1 tablet every day by oral route., Disp: , Rfl:     rosuvastatin (CRESTOR) 10 mg tablet, nightly., Disp: , Rfl:     chlorthalidone (HYGROTON) 25 mg tablet, chlorthalidone 25 mg tablet  Take 1 tablet every day by oral route., Disp: , Rfl:     albuterol (PROVENTIL HFA, VENTOLIN HFA, PROAIR HFA) 90 mcg/actuation inhaler, Take 1-2 Puffs by inhalation every four (4) hours as needed for Wheezing., Disp: 1 Inhaler, Rfl: 1    SOCIAL HISTORY:   Social History     Socioeconomic History    Marital status: SINGLE     Spouse name: Not on file    Number of children: Not on file    Years of education: Not on file    Highest education level: Not on file   Occupational History    Not on file   Tobacco Use    Smoking status: Former     Packs/day: 0.50     Years: 30.00     Pack years: 15.00     Types: Cigarettes    Smokeless tobacco: Never   Substance and Sexual Activity    Alcohol use: No    Drug use: Yes     Frequency: 7.0 times per week     Types: Heroin    Sexual activity: Yes     Partners: Female     Birth control/protection: Condom     Comment: Yesterday   Other Topics Concern    Not on file   Social History Narrative    Not on file     Social Determinants of Health     Financial Resource Strain: Not on file   Food Insecurity: Not on file   Transportation Needs: Not on file   Physical Activity: Not on file   Stress: Not on file   Social Connections: Not on file   Intimate Partner Violence: Not on file   Housing Stability: Not on file       FAMILY HISTORY:  No family history on file. REVIEW OF SYSTEMS: Complete ROS assessed and noted for that which is described above, all else are negative. CONSTITUTIONAL: no fevers, chills, weight loss  EYES: no blurry vision or double vision  CARDIOVASCULAR: no chest pain or palpitations  RESPIRATORY: no cough or shortness of breath  GASTROINTESTINAL: no dysphagia or abdominal pain  MUSCULOSKELETAL: no joint pains or weakness  SKIN: no rashes  NEUROLOGICAL: no numbness, tingling, or headaches  PSYCHIATRIC: no depression or anxiety  ENDOCRINE: no heat or cold intolerance, no polyuria or polydipsia      PHYSICAL EXAMINATION:  VITAL SIGNS:  Visit Vitals  BP (!) 155/93   Pulse (!) 120   Ht 5' 11\" (1.803 m)   Wt 199 lb 12.8 oz (90.6 kg)   BMI 27.87 kg/m²     Last 3 Recorded Weights in this Encounter    10/12/22 1324   Weight: 199 lb 12.8 oz (90.6 kg)        GENERAL: NCAT, Sitting comfortably, NAD  EYES: EOMI, non-icteric, no proptosis  Ear/Nose/Throat: NCAT, no inflammation, no masses  LYMPH NODES: No LAD  CARDIOVASCULAR: S1 S2, RRR, No murmur, 2+ radial pulses  RESPIRATORY: CTA b/l, no wheeze/rales  GASTROINTESTINAL: NT, ND  MUSCULOSKELETAL: Normal ROM, no atrophy  SKIN: warm, no edema/rash/ or other skin changes  NEUROLOGIC: 5/5 power all extremities, no tremor, AAOx3  PSYCHIATRIC: Normal affect, Normal insight and judgement    Diabetic foot exam:    Visual Exam: normal    Pulse DP: 2+ (normal)   Filament test: normal sensation    Vibratory sensation: normal      REVIEW OF LABORATORY AND RADIOLOGY DATA:   Labs and documentation have been reviewed as described above.      ASSESSMENT AND PLAN:   Tino Benton is a 46 y.o. male with a PMHx as noted above who presents for evaluation of uncontrolled type 2 diabetes. Problems:  Type 2 diabetes Uncontrolled  Hyperlipidemia  Hypertension    We had the pleasure of reviewing together the basics of diabetes including basic pathophysiology and diabetes care. We further discussed the importance of checking home glucose regularly and takin all of their scheduled medications in order to have the best possible outcome. I was able to answer any questions they had in clinic today and they are invited to reach me if they have any further questions. Based upon our discussion together today we have decided to make the following changes:    Patients blood sugar remains elevated above goal. Mostly related to post-prandial blood sugars and they would benefit from a next-step therapeutic approach. In this case, with consideration to the oral medications they are already on or have tried in the past, the next best step would be to start a sulfonylurea to help control daytime blood sugars. We discussed the possibility of this class of medication resulting in hypoglycemia, however considering their insulin resistance, I do not suspect this would become a frequent problem, though they are aware to reduce to half dose and let me know if this is the case. We discussed the possibility of using GLP-1 agonists in the treatment of their diabetes which would potentially help to control their prandial blood sugars, as this is an area in which they need to improve. The patient denies any history of pancreatitis or thyroid cancer. We discussed the possibility of experiencing a little nausea during the first week or two of use which will likely resolve. Patient is interested to trial this as part of their regimen.       PLAN  Type 2 Diabetes  A1c goal <7%, today 12.9%  Medications: Increase metformin from 500mg to 1000mg twice daily (take with breakfast and dinner)  Start Glipizide 10mg twice daily (take 30 mins before breakfast and dinner)  Start Ozempic 0.25mg weekly, after 4 weeks if tolerated increase to 0.5mg weekly  Given Ozempic sample in the clini  STOP REGULAR SODA    Advised to check glucose 2 times per day   Provided with glucose log sheets for later review. Referred for DM education    HTN: per patient at home measurements his BP stable 120/80s on current medications, no changes today. Chlorthalidone 25mg daily has been taking for  6 months    HLD: Fasting lipids to be obtained today  No results found for: CHOL, CHOLPOCT, CHOLX, CHLST, CHOLV, TOTCHOLEXT, HDL, HDLPOC, HDLEXT, HDLP, LDL, LDLCPOC, LDLCEXT, LDLC, DLDLP, VLDLC, VLDL, TGLX, TRIGL, TRIGLYCEXT, TRIGP, TGLPOCT, CHHD, CHHDX     Tachycardia  Advised patient may be due to dehydration given his elevated a1c, advised him to seek urgent care as he will need further evaluation, he indicates understanding and agrees with plan, he denies SOB, CP or dizziness/lightheadedness at this time, says he also has to set up appointment with cardiology per his discussion with his PCP    RTC 4 weeks    We discussed the expected course, resolution and complications of the diagnosis(es) in detail. Medication risks, benefits, costs, interactions, and alternatives were discussed as indicated. I advised Kristina Das to contact the office if him condition worsens, changes or fails to improve as anticipated. Patient expressed understanding with the diagnosis(es) and plan. Jey Rosa MD   [facility]    Please see patient instructions.

## 2022-11-09 ENCOUNTER — OFFICE VISIT (OUTPATIENT)
Dept: ENDOCRINOLOGY | Age: 52
End: 2022-11-09
Payer: COMMERCIAL

## 2022-11-09 VITALS
SYSTOLIC BLOOD PRESSURE: 136 MMHG | WEIGHT: 196.2 LBS | BODY MASS INDEX: 27.47 KG/M2 | HEIGHT: 71 IN | HEART RATE: 95 BPM | DIASTOLIC BLOOD PRESSURE: 93 MMHG

## 2022-11-09 DIAGNOSIS — R00.0 TACHYCARDIA: ICD-10-CM

## 2022-11-09 DIAGNOSIS — I10 PRIMARY HYPERTENSION: ICD-10-CM

## 2022-11-09 DIAGNOSIS — E11.65 TYPE 2 DIABETES MELLITUS WITH HYPERGLYCEMIA, WITHOUT LONG-TERM CURRENT USE OF INSULIN (HCC): Primary | ICD-10-CM

## 2022-11-09 DIAGNOSIS — E78.5 DYSLIPIDEMIA: ICD-10-CM

## 2022-11-09 PROCEDURE — 3074F SYST BP LT 130 MM HG: CPT | Performed by: GENERAL ACUTE CARE HOSPITAL

## 2022-11-09 PROCEDURE — 3078F DIAST BP <80 MM HG: CPT | Performed by: GENERAL ACUTE CARE HOSPITAL

## 2022-11-09 PROCEDURE — 3046F HEMOGLOBIN A1C LEVEL >9.0%: CPT | Performed by: GENERAL ACUTE CARE HOSPITAL

## 2022-11-09 PROCEDURE — 99214 OFFICE O/P EST MOD 30 MIN: CPT | Performed by: GENERAL ACUTE CARE HOSPITAL

## 2022-11-09 RX ORDER — GLIPIZIDE 10 MG/1
20 TABLET, FILM COATED, EXTENDED RELEASE ORAL DAILY
Qty: 60 TABLET | Refills: 3 | Status: SHIPPED | OUTPATIENT
Start: 2022-11-09

## 2022-11-09 RX ORDER — SEMAGLUTIDE 1.34 MG/ML
1 INJECTION, SOLUTION SUBCUTANEOUS
Qty: 3 EACH | Refills: 3 | Status: SHIPPED | OUTPATIENT
Start: 2022-11-09

## 2022-11-09 RX ORDER — FLASH GLUCOSE SENSOR
KIT MISCELLANEOUS
Qty: 2 KIT | Refills: 11 | Status: SHIPPED | OUTPATIENT
Start: 2022-11-09

## 2022-11-09 RX ORDER — FLASH GLUCOSE SCANNING READER
EACH MISCELLANEOUS
Qty: 1 EACH | Refills: 0 | Status: SHIPPED | OUTPATIENT
Start: 2022-11-09

## 2022-11-09 NOTE — LETTER
11/9/2022    Patient: Jay Starks   YOB: 1970   Date of Visit: 11/9/2022     Nils Jordan MD  [address] [place] [id number]  Via Fax: [phone]    Dear Nils Jordan MD,      Thank you for referring Mr. Jay Starks to [address] [place] [address] for evaluation. My notes for this consultation are attached. If you have questions, please do not hesitate to call me. I look forward to following your patient along with you.       Sincerely,    Sal Min MD

## 2022-11-09 NOTE — PROGRESS NOTES
REFERRED BY: Garo Rasmussen MD     REASON:  Uncontrolled type 2 diabetes mellitus    CHIEF COMPLAINT: evaluation of type 2 diabetes mellitus    HISTORY OF PRESENT ILLNESS:   Kathe Vargas is a 46 y.o. male with a PMHx as noted below who presents for evaluation of uncontrolled type 2 diabetes. Was following with PCP Dr Hernandez Born    In the last visit we started mr Valentine on glipizide 10mg BID, increased MTF to 1g BID and started Ozempic 0.25mg and now for 1 week he is on 0.5mg weekly. He feels better overall and has cut out the regular soda, but he is eating late night snacks. He is trying to be more active.      Diabetes History:  Diabetes was diagnosed: 2 years, was prediabetic before that 2-3 yrs  Family History of diabetes is Positive mother DM2  Last A1c prior to initial visit was: 12.9% 10/12/2022    Diabetes-related Hospitalizations: never    Current Home Regimen:  Metformin 500mg BID    Review of home glucose:    Before lunch high 100s, low 200s  Bedtime lower 100s    Hypoglycemia:no    Diet:  eats overnight cookie, donut  -3 meals  Breakfast = boiled eggs 2 slices toast, orange 8 oz with sugar, banana  Lunch = salad, stopped eating fried foods hamburger/cheese burgers for approx 4 weeks  Dinner = everything baked, chicken pork chops, spaghetti, rice, mac and cheese, potatoes, corn, string beans, yams  Snacks = does not snack, occass potato chips  Shauna = orange juice, regular sodas 16 oz x2 bottles per day    Physical Activity:  -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  -[employer] lifestyle, 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, 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    Complications:    MI or CVA:No  PVD: No  Retinopathy:No     Last Ophthalm:every year has appt on Oct, last 10/2021, 11/14/2022 next appt  Nephropathy:No  Neuropathy:No      Last Podiatry: will make appt  Gastropathy: No  Amputations: No      On a Statin:Yes rosuvastatin 10mg daily  On an ACEI/ARB:Yes olmesartan 40 mg daily  On Aspirin:Yes  Smoker:No    Diabetes Education:denies        Review of most recent diabetes-related labs:  Lab Results   Component Value Date    BNB8ZDCI 12.9 10/12/2022    CHOL 247 (H) 10/29/2022    LDLC 159 (H) 10/29/2022    GFRAA >60 12/17/2021    GFRNA 60 (L) 12/17/2021    MCACR 79 (H) 10/29/2022     Lab Key:  [id number] = IA-2 pancreatic islet cell autoantibody  CPEPL = C-peptide level  :EXT = External Lab  GADLT = KELLEN-65 autoantibody   INSUL = Insulin level  MCACR (or MALBEXT) = Urine Microalbumin (or External UM)  B12LT = B12 level    PAST MEDICAL/SURGICAL HISTORY:   Past Medical History:   Diagnosis Date    HX OTHER MEDICAL     Hypertension     Other ill-defined conditions(229.89)     kidney stones     No past surgical history on file. ALLERGIES:   No Known Allergies    MEDICATIONS ON ADMISSION:     Current Outpatient Medications:     glipiZIDE SR (GLUCOTROL XL) 10 mg CR tablet, Take 2 Tablets by mouth daily. , Disp: 60 Tablet, Rfl: 3    semaglutide (Ozempic) 1 mg/dose (4 mg/3 mL) pnij, 1 mg by SubCUTAneous route every seven (7) days. Dispense 3 pens = 9 ml for a 90 day supply, Disp: 3 Each, Rfl: 3    FreeStyle Rudy 2 Sensor kit, Use as directed to test blood sugars at least 4 times daily.   Replace every 14 days  Dx: E11.65, Disp: 2 Kit, Rfl: 11    FreeStyle Rudy 2 [place] misc, Use as directed with freestyle rudy 2 sensors   Dx: E11.65, Disp: 1 Each, Rfl: 0    aspirin delayed-release 81 mg tablet, aspirin 81 mg tablet,delayed release  TAKE 1 TABLET BY MOUTH ONCE DAILY, Disp: , Rfl:     olmesartan (BENICAR) 40 mg tablet, olmesartan 40 mg tablet  Take 1 tablet every day by oral route., Disp: , Rfl:     rosuvastatin (CRESTOR) 10 mg tablet, nightly., Disp: , Rfl:     chlorthalidone (HYGROTON) 25 mg tablet, chlorthalidone 25 mg tablet  Take 1 tablet every day by oral route., Disp: , Rfl:     Blood-Glucose Meter monitoring kit, 1 preferred brand glucometer for checking home glucose E11.65, Disp: 1 Kit, Rfl: 0    lancets misc, Use preferred brand; Check glucose 2 times daily, Diagnosis E11.65, Disp: 1 Each, Rfl: 11    glucose blood VI test strips (Pharmacist Choice) strip, Use preferred brand; Check glucose 2 times daily, Diagnosis E11.65, Disp: 100 Strip, Rfl: 5    metFORMIN (GLUCOPHAGE) 1,000 mg tablet, Take 1 Tablet by mouth two (2) times daily (with meals). , Disp: 60 Tablet, Rfl: 5    albuterol (PROVENTIL HFA, VENTOLIN HFA, PROAIR HFA) 90 mcg/actuation inhaler, Take 1-2 Puffs by inhalation every four (4) hours as needed for Wheezing., Disp: 1 Inhaler, Rfl: 1    SOCIAL HISTORY:   Social History     Socioeconomic History    Marital status: SINGLE     Spouse name: Not on file    Number of children: Not on file    Years of education: Not on file    Highest education level: Not on file   Occupational History    Not on file   Tobacco Use    Smoking status: Former     Packs/day: 0.50     Years: 30.00     Pack years: 15.00     Types: Cigarettes    Smokeless tobacco: Never   Substance and Sexual Activity    Alcohol use: No    Drug use: Yes     Frequency: 7.0 times per week     Types: Heroin    Sexual activity: Yes     Partners: Female     Birth control/protection: Condom     Comment: Yesterday   Other Topics Concern    Not on file   Social History Narrative    Not on file     Social Determinants of Health     Financial Resource Strain: Not on file   Food Insecurity: Not on file   Transportation Needs: Not on file   Physical Activity: Not on file   Stress: Not on file   Social Connections: Not on file   Intimate Partner Violence: Not on file   Housing Stability: Not on file       FAMILY HISTORY:  No family history on file. REVIEW OF SYSTEMS: Complete ROS assessed and noted for that which is described above, all else are negative.     CONSTITUTIONAL: no fevers, chills, weight loss  EYES: no blurry vision or double vision  CARDIOVASCULAR: no chest pain or palpitations  RESPIRATORY: no cough or shortness of breath  GASTROINTESTINAL: no dysphagia or abdominal pain  MUSCULOSKELETAL: no joint pains or weakness  SKIN: no rashes  NEUROLOGICAL: no numbness, tingling, or headaches  PSYCHIATRIC: no depression or anxiety  ENDOCRINE: no heat or cold intolerance, no polyuria or polydipsia      PHYSICAL EXAMINATION:  VITAL SIGNS:  Visit Vitals  BP (!) 136/93   Pulse 95   Ht 5' 11\" (1.803 m)   Wt 196 lb 3.2 oz (89 kg)   BMI 27.36 kg/m²     Last 3 Recorded Weights in this Encounter    11/09/22 1127   Weight: 196 lb 3.2 oz (89 kg)        GENERAL: NCAT, Sitting comfortably, NAD  EYES: EOMI, non-icteric, no proptosis  Ear/Nose/Throat: NCAT, no inflammation, no masses  LYMPH NODES: No LAD  CARDIOVASCULAR: S1 S2, RRR, No murmur, 2+ radial pulses  RESPIRATORY: CTA b/l, no wheeze/rales  GASTROINTESTINAL: NT, ND  MUSCULOSKELETAL: Normal ROM, no atrophy  SKIN: warm, no edema/rash/ or other skin changes  NEUROLOGIC: 5/5 power all extremities, no tremor, AAOx3  PSYCHIATRIC: Normal affect, Normal insight and judgement    Diabetic foot exam 10/2022:  bilateral   Visual Exam: normal    Pulse DP: 2+ (normal)   Filament test: normal sensation    Vibratory sensation: normal      REVIEW OF LABORATORY AND RADIOLOGY DATA:   Labs and documentation have been reviewed as described above. ASSESSMENT AND PLAN:   Zenon Murphy is a 46 y.o. male with a PMHx as noted above who presents for evaluation of uncontrolled type 2 diabetes. Problems:  Type 2 diabetes Uncontrolled  Hyperlipidemia  Hypertension    Patients blood sugar remains elevated above goal. Mostly related to post-prandial blood sugars and they would benefit from a next-step therapeutic approach. In this case, with consideration to the oral medications they are already on or have tried in the past, the next best step would be to start a sulfonylurea to help control daytime blood sugars.  We discussed the possibility of this class of medication resulting in hypoglycemia, however considering their insulin resistance, I do not suspect this would become a frequent problem, though they are aware to reduce to half dose and let me know if this is the case. PLAN  Type 2 Diabetes  A1c goal <7%, today 12.9%  Medications:   Metformin 1000mg twice daily (take with breakfast and dinner)  Stop Glipizide regular and take the Slow Release 10mg two tablets in the morning  Ozempic increase from 0.5mg to 1mg weekly after 4 weeks total     STOP REGULAR SODA and STOP LATE NIGHT SNACKS    Measure fasting (first thing in the morning), before dinner and bedtime. Referred for DM education, upcoming class on 11/14/2022    HTN: per patient at home measurements his BP stable 120/80s on current medications, no changes today. Chlorthalidone 25mg daily has been taking for  6 months  On Olmesartan microalbumin 79    HLD: Fasting lipids reviewed, he is on rosuvastatin 10mg daily, elevated LDL likely related to poor diet and sugar control, will repeat in 3 months and based on results will decide on further managementr  Lab Results   Component Value Date/Time    Cholesterol, total 247 (H) 10/29/2022 08:58 AM    HDL Cholesterol 49 10/29/2022 08:58 AM    LDL, calculated 159 (H) 10/29/2022 08:58 AM    VLDL, calculated 39 10/29/2022 08:58 AM    Triglyceride 211 (H) 10/29/2022 08:58 AM        Tachycardia  Advised patient may be due to dehydration given his elevated a1c, advised him to seek urgent care as he will need further evaluation, he indicates understanding and agrees with plan, he denies SOB, CP or dizziness/lightheadedness at this time, says he also has to set up appointment with cardiology per his discussion with his PCP, but he has not received a call from them, given Cariology referral today to Dr Raimundo Lynch    RTC 8 weeks    We discussed the expected course, resolution and complications of the diagnosis(es) in detail.     Medication risks, benefits, costs, interactions, and alternatives were discussed as indicated. I advised Kathe Vargas to contact the office if him condition worsens, changes or fails to improve as anticipated. Patient expressed understanding with the diagnosis(es) and plan. Lauren Cisneros MD   [facility]    Please see patient instructions.

## 2022-11-09 NOTE — PATIENT INSTRUCTIONS
Diabetes Education referral made: Call them for Appt  [facility] for [address], [address] [address]  Phone [phone]  Fax [phone]     Plan to repeat your diabetes eye exam in the near future. Please be sure to have the eye clinic / office fax us the report of your latest eye exam to our clinic to fax # [phone]. This is very important for us to keep track of any effect of diabetes on your vision as part of our wholesome diabetes care that we provide. ............................................................................................................................................. PLAN FOR TODAY    We will plan to make the following changes to your diabetes medications:  Metformin 1000mg twice daily (take with breakfast and dinner)  Stop Glipizide regular and take the Slow Release 10mg two tablets in the morning  Ozempic increase from 0.5mg to 1mg weekly after 4 weeks total     STOP REGULAR SODA and STOP LATE NIGHT SNACKS    Measure fasting (first thing in the morning), before dinner and bedtime. Diabetes and Meal Planning    Meal planning can be a key part of managing diabetes. Planning meals and snacks with the right balance of carbohydrate, protein, and fat can help you keep your blood sugar at the target level. You don't have to eat special foods. You can eat what your family eats, including sweets once in a while. But you do have to pay attention to how often you eat and how much you eat of certain foods. Your plate  The plate format is a simple way to help you manage how you eat. You plan meals by learning how much space each food should take on a plate. It can make it easier to keep your blood sugar level within your target range. It also helps you see if you're eating healthy portion sizes. To use the plate format, you put non-starchy vegetables on half your plate.  Add lean protein foods, such as fish, lean meats and poultry, or soy products, on one-quarter of the plate. Put a grain or starchy vegetable (such as brown rice or a potato) on the final quarter of the plate. You can add a small piece of fruit and some low-fat or fat-free milk or yogurt, depending on your carbohydrate goal for each meal.  Make sure that you are not using an oversized plate. A 9-inch plate is best.    Carbohydrates  Carbohydrate raises blood sugar higher and more quickly than any other nutrient. It is found in desserts, breads and cereals, and fruit. It's also found in starchy vegetables such as potatoes and corn, grains such as rice and pasta, and milk and yogurt. You can help keep your blood sugar levels within your target range by planning how much carbohydrate to have at meals and snacks. The amount you need depends on several things. These include your weight, how active you are, which diabetes medicines you take, and what your goals are for your blood sugar levels. An example of a carbohydrate counting plan is:  45 to 60 grams at each meal. That's about the same as 3 to 4 carbohydrate servings. 15 to 20 grams at each snack. That's about the same as 1 carbohydrate serving. The Nutrition Facts label on packaged foods tells you how much carbohydrate is in a serving of the food. First, look at the serving size on the food label. All of the nutrition information on a food label is based on that serving size. For foods that don't come with labels, such as fresh fruits and vegetables, you'll need a guide that lists carbohydrate in these foods. You may use an tanisha on your smart phone called [employer]. How can you plan healthy meals? Here are some tips to get started:  Plan your meals a week at a time. Don't forget to include snacks too. Use cookbooks or online recipes to plan several main meals. Plan some quick meals for busy nights. You also can double some recipes that freeze well.  Then you can save half for other busy nights when you don't have time to cook. Make sure you have the ingredients you need for your recipes. If you're running low on basic items, put these items on your shopping list too. List foods that you use to make breakfasts, lunches, and snacks. List plenty of fruits and vegetables. Post this list on the refrigerator. Add to it as you think of more things you need. Take the list to the store to do your weekly shopping. Follow-up care is a key part of your treatment and safety. Be sure to make and go to all appointments, and call your doctor if you are having problems. It's also a good idea to know your test results and keep a list of the medicines you take.    --------------------------------------------------------------------------------------------------------------------------------------------------------------------------------  Diabetes Dental Care  When you have diabetes, managing blood sugar levels and taking good care of your teeth and gums are both important. When blood sugar levels are high, there's a greater risk for Gum (periodontal) disease. Tooth decay. Fungal infections in the mouth, like thrush. Dry mouth. Keeping your blood sugar levels in your target range can help prevent problems with the teeth and gums. If you have any problems with your teeth or gums, it is important see your dentist.  How do you care for your teeth and gums when you have diabetes? Brush your teeth twice a day. Floss daily. Make sure to press the floss against your teeth and not your gums. Check each day for areas where your gums might be red or painful. Be sure to let your dentist know of any sores in your mouth. See your dentist regularly for professional cleaning of your teeth and to look for gum problems. Many dentists recommend getting checkups twice a year. Remind your dentist that you have diabetes before any work is done.   Don't smoke or use smokeless tobacco.    --------------------------------------------------------------------------------------------------------------------------------------------------------------------------------  Diabetes Foot Care    When you have diabetes, your feet need extra care and attention. Diabetes can damage the nerve endings and blood vessels in your feet, making you less likely to notice when your feet are injured. Diabetes also limits your body's ability to fight infection. If you get a minor foot injury, it could become an ulcer or a serious infection. With good foot care, you can prevent most of these problems. Caring for your feet can be quick and easy. Most of the care can be done when you are bathing or getting ready for bed. Keep your blood sugar close to normal by watching what and how much you eat, monitoring blood sugar, taking medicines if prescribed, and getting regular exercise. Do not smoke. Smoking affects blood flow and can make foot problems worse. Eat a diet that is low in fats. High fat intake can cause fat to build up in your blood vessels and decrease blood flow. Inspect your feet daily for blisters, cuts, cracks, or sores. If you cannot see well, use a mirror or have someone help you. Take care of your feet:  Wash your feet every day. Use warm (not hot) water. Check the water temperature with your wrists or other part of your body, not your feet. Dry your feet well. If the skin on your feet stays moist, bacteria or a fungus can grow, which can lead to infection. Use moisturizing skin cream to keep the skin on your feet soft and prevent calluses and cracks. Stop using any cream that causes a rash. Clean underneath your toenails carefully. Do not use a sharp object to clean underneath your toenails. Change socks daily. Look inside your shoes every day for things like gravel or torn linings, which could cause blisters or sores.   Buy shoes that fit well but not too tightly to prevent bunions and blisters. Shoes should be flexible and breathable but prevent from injury. Do not go barefoot, especially at night, to prevent injury. Do not try to treat an early foot problem at home. Home remedies or treatments that you can buy without a prescription (such as corn removers) can be harmful. Seek immediate help if:   You have a foot sore, an ulcer or break in the skin that is not healing after 4 days, bleeding corns or calluses, or an ingrown toenail. You have blue or black areas. You have peeling skin or tiny blisters between your toes or cracking or oozing of the skin. You have a fever for more than 24 hours and a foot sore. You have new numbness or tingling in your feet that does not go away after you move your feet or change positions. You have new unexplained or unusual swelling of the foot or ankle.

## 2022-11-16 ENCOUNTER — CLINICAL SUPPORT (OUTPATIENT)
Dept: DIABETES SERVICES | Age: 52
End: 2022-11-16
Payer: COMMERCIAL

## 2022-11-16 DIAGNOSIS — E11.65 TYPE 2 DIABETES MELLITUS WITH HYPERGLYCEMIA, WITHOUT LONG-TERM CURRENT USE OF INSULIN (HCC): Primary | ICD-10-CM

## 2022-11-16 PROCEDURE — G0108 DIAB MANAGE TRN  PER INDIV: HCPCS

## 2022-11-16 NOTE — PROGRESS NOTES
[employer] [facility] Diabetes Health  Diabetes Self-Management Education & Support Program    Reason for Referral: Elevated Hemoglobin A1c  Referral Source: Sabrina Riley MD  Services requested: DSMES       ASSESSMENT    From my perspective, the participant would benefit from [facility] specifically related to reducing risks, healthy eating, monitoring, taking medications, physical activity, healthy coping, and problem solving. Will adapt DSMES program to build on participant's skills score, confidence score, and preparedness score as noted in the Diabetes Skills, Confidence, and Preparedness Index. During the program, we will focus on providing DSMES that specifically addresses participant's interest in reducing risks, healthy eating, monitoring, taking medications, physical activity, healthy coping, and problem solving, as shown by their reported readiness to change. The participant would be best served by attending weekly individual sessions. Diabetes Self-Management Education Follow-up Visit: 12/2/2022 at 2:30 PM       Clinical Presentation  Aparna Scruggs is a 46 y.o.  male referred for diabetes self-management education. Participant has Type 2 DM not on insulin for <1 year. Family history positivefor diabetes. Patient reports not receiving DSMES services in the past. Most recent A1c value:   Lab Results   Component Value Date/Time    Hemoglobin A1c (POC) 12.9 10/12/2022 03:10 PM     Diabetes-related medications:  Current dosing:   Key Antihyperglycemic Medications               glipiZIDE SR (GLUCOTROL XL) 10 mg CR tablet Take 2 Tablets by mouth daily. semaglutide (Ozempic) 1 mg/dose (4 mg/3 mL) pnij 1 mg by SubCUTAneous route every seven (7) days. Dispense 3 pens = 9 ml for a 90 day supply    metFORMIN (GLUCOPHAGE) 1,000 mg tablet Take 1 Tablet by mouth two (2) times daily (with meals).             Blood Pressure Management  Key ACE/ARB Medications               olmesartan (BENICAR) 40 mg tablet olmesartan 40 mg tablet   Take 1 tablet every day by oral route. Lipid Management  Key Antihyperlipidemia Meds               rosuvastatin (CRESTOR) 10 mg tablet nightly. Clot Prevention  Key Anti-Platelet Anticoagulant Meds               aspirin delayed-release 81 mg tablet aspirin 81 mg tablet,delayed release   TAKE 1 TABLET BY MOUTH ONCE DAILY            Learning Assessment  Learning objectives Educator assessment (11/16/2022)   Diabetes Disease Process  The participant can   A) describe diabetes in basic terms;   B) state the type of diabetes they have; &   C) state accepted blood glucose targets. Healthy Eating  The participant can   A) identify carbohydrate foods; &   B) accurately read food labels. Being Active  The participant can  A) state the benefits of physical activity;  B) report their current PA practices;  C) identify PA they would consider incorporating in their lives; &  D) develop an implementation plan. Monitoring  The participant can  A) operate their blood glucose meter; &  B) describe how they log their blood glucoses to share with their provider. Taking Medications  The participant can  A) name their diabetes medications;  B) state the purpose and dose;  C) note side effects; &  D) describe proper storage, disposal & transport (if appropriate). Healthy Coping  The participant can    A) describe their response to diabetes diagnosis; B) describe their specific coping mechanisms;  C) identify supportive people and/or other resources that positively support their diabetes self-care and health. Reducing Risks  The participant can describe the preventive measures used by providers to promote health and prevent diabetes complications.      Problem Solving  The participant can   A) identify signs, symptoms & treatment of hypoglycemia;    B) identify signs, symptoms & treatment of hyperglycemia;  C) describe their sick day plan; &  D) identify BG patterns to discuss with their provider. No  No  No        No  No        No  Yes  Yes  No        Yes  Yes        Yes  Yes  Yes  Yes        Yes  No  Yes        Yes          No  Yes  No  No     Characteristics to Learning   Barriers to Learning      None     Favorite Ways to Learn   [] Lecture  [] Slides  [x] Reading [x] Video-Internet  [] Cassettes/CDs/MP3's  [] Interactive Small Groups [] Other       Behavioral Assessment  Current self-care practices  Educator assessment (11/16/2022)   Healthy Eating   Current practices    24-hour Dietary Recall:  Breakfast: 1 bowl of oatmeal with butter and little bit of sugar  Lunch: [place] and cheese sandwich, frito's chips   Dinner: Cheese burger with no bun, salad with [place] dressing   Snacks: None  Beverages: Water  Alcohol: None       Would benefit from DSMES related to Healthy Eating: Yes    Eats a carbohydrate controlled diet: No    Stage of change: Preparation      Being Active  Current practices  How many days during the past week have you performed physical activity where your heart beats faster and your breathing is harder than normal for 30 minutes or more? 5 day(s)    How many days in a typical week do you perform activity such as this?  5 day(s)     Would benefit from [facility] related to Being Active: Yes}      Exercises 150 minutes/week: Yes      Stage of change: Action     Monitoring  Current practices  Do you monitor your blood sugar? Yes    How often do you monitor? 4x/day    What are the range of readings?  mg/dL    Do you know your last A1c measurement? Yes    Do you know the meaning of the A1c? No     Would benefit from [facility] related to Monitoring: Yes      Uses BG readings to establish trends and understand BG patterns: No      Stage of change: Preparation       Taking Medication  Current practices  Do you understand what your diabetes medications do?  Yes    How often do you miss doses of your diabetes medications? never    Can you afford your diabetes medications? Yes   Would benefit from [facility] related to Taking Medication: Yes      Takes medications consistently to receive full benefit: Yes      Stage of change: Maintenance        Healthy Coping   Current state  Diabetes Skills, Confidence and Preparedness Index: Total score: 5.0  Skills: 4.6  Confidence: 4.9  Preparedness: 6.0       Would benefit from DSMES related to Healthy Coping: Yes      Identifies specific people, organizations,etc, that actively support their diabetes self-care efforts: Yes      Stage of change: Action     Reducing Risks  Current state  Vaccines:  Influenza: There is no immunization history for the selected administration types on file for this patient. Pneumococcal: There is no immunization history for the selected administration types on file for this patient. Hepatitis: There is no immunization history for the selected administration types on file for this patient. Examinations:  Diabetic Foot and Eye Exam HM Status   Topic Date Due    Eye Exam  11/16/2022 (Originally 11/29/1980)        Dental exam: due    Foot exam: due    Heart Protection:  BP Readings from Last 2 Encounters:   11/09/22 (!) 136/93   10/12/22 (!) 155/93        Lab Results   Component Value Date/Time    LDL, calculated 159 (H) 10/29/2022 08:58 AM        Kidney Protection:  Lab Results   Component Value Date/Time    Microalb/Creat ratio (ug/mg creat.) 79 (H) 10/29/2022 08:58 AM        Would benefit from [facility] related to Reducing Risks:  Yes      Actively participates in decision-making with provider regarding secondary prevention:  No      Stage of change: Action   Problem Solving  Current state  Hypoglycemia Management:  What are signs and symptoms of hypoglycemia that you experience: Pt reported being unaware of s/s of hypoglycemia    How do you prevent hypoglycemia: patient is unaware of how to treat low blood sugars    How do you treat hypoglycemia: Patient is unaware of how to treat hypoglycemia    Hyperglycemia Management:  What are signs and symptoms of hyperglycemia that you experience: Frequent urination    How can you prevent hyperglycemia: take medications as instructed    Sick Day Management:  What do you do differently on sick days:  Pt reported being unaware of self-management on sick days    Pattern Management:  Do you notice blood glucose patterns when you look at the readings in your meter or logbook? No    How do you use the blood glucose readings from your meter or logbook? understand how body responds to meals     Would benefit from [facility] related to Problem Solving: Yes      Articulates appropriate strategies to address hypoglycemia, hyperglycemia, sick day care and BG pattern: No      Stage of change: Preparation       Note: Content derived from the American Association of Diabetes Educators' Diabetes Education Curriculum: A Guide to Successful Self-Management (3rd edition)      Radha Jackson RN on 11/16/2022 at 1:05 PM    I have personally reviewed the health record, including provider notes, laboratory data and current medications before making these care and education recommendations. The time spent in this effort is included in the total time. Total minutes: 35    Thank you for completing the Skills, Confidence & Preparedness Index! Below are your scores. All scales and questions are out of 7. If you would like these results emailed, please enter your email address along with some identifying patient information.   Email:    Patient Identifier:        Overall SCPI score: 5.0 Skills Score: 4.6  Low: Healthy Eating(Q1),Problem Solving(Q6),Blood Sugar Monitoring(Q8) Confidence Score: 4.9  Low: Healthy Eating(Q1),Healthy Coping(Q2) Preparedness Score: 6.0  Low: Healthy Eating(Q1),Being Active(Q2),Healthy Coping(Q3),Reducing Risks(Q4),Blood Sugar Monitoring(Q6),Problem Solving(Q7)  Healthy Eating Score: 4.0  Low: Skills(Q1),Confidence(Q1) Taking Medication Score: 5.0  Low: Skills(Q2) Blood Sugar Monitoring Score: 5.2  Low: MWAVIW(Y6) Reducing Risks Score: 6.0  Low: Skills(Q5),Skills(Q9),Confidence(Q3),Preparedness(Q4)  Problem Solving Score: 4.7  Low: Skills(Q6) Healthy Coping Score: 4.7  Low: Confidence(Q2) Being Active Score: 6.0  Low: Confidence(Q5),Preparedness(Q2)    Skills/Knowledge Questions  1. I know how to plan meals that have the best balance between carbohydrates, proteins and vegetables. 2  2. I know how my diabetes medications (pills, injectables and/or insulin) work in my body. 5  3. I know when to check my blood sugar if I want to see how my body responded to a meal. 6  4. I know when to check my blood sugars to determine if my medication or insulin doses are correct. 6  5. I know what to do to prevent a low blood sugar when I exercise (either before, during, or after). 6  6. When I am sick, I know what to do differently with my diabetes management. 2  7. I know how stress can affect my diabetes management. 6  8. When I look at my blood sugars over a given week, I can explain what my blood sugar pattern is. 2  9. I know what my target levels are for A1c, blood pressure and cholesterol. 6  Confidence Questions  1. I am confident that I can plan balanced meals and snacks. 2  2. I am confident that I can manage my stress. 2  3. I am confident that I can prevent a low blood sugar during or after exercise. 6  4. I am confident that the next time I eat out, I will be able to choose foods that best keep my blood sugars in target. 6  5. I am confident I can include exercise into my schedule. 6  6. I am confident that I can use my daily blood sugars to adjust my diet, my activity, and/or my insulin. 6  7. When something out of my normal routine happens, I am confident that I can problem-solve and keep my diabetes on track. 6  Preparedness Questions  1. Within the next month, I will begin to eat more balanced meals and snacks. 6  2.  Within the next month, I will choose an exercise activity and I will start fitting it into my schedule. 6  3. Within the next month, I will make a list of stress management options that work for me. 6  4. Within the next month, I will consistently plan ahead to prevent low blood sugars. 6  5. Within the next month, I will start adjusting my insulin doses on my own. 0  6. Within the next month, I will begin making changes to my diabetes management based on my daily blood sugars (eg - eating, activity and/or insulin). 6  7. Within the next month, I will begin making changes to my diabetes management to meet my overall goals (eg - eating, activity and/or insulin).  6

## 2022-12-02 ENCOUNTER — CLINICAL SUPPORT (OUTPATIENT)
Dept: DIABETES SERVICES | Age: 52
End: 2022-12-02
Payer: COMMERCIAL

## 2022-12-02 DIAGNOSIS — E11.65 TYPE 2 DIABETES MELLITUS WITH HYPERGLYCEMIA, WITHOUT LONG-TERM CURRENT USE OF INSULIN (HCC): Primary | ICD-10-CM

## 2022-12-02 NOTE — PROGRESS NOTES
[facility] for Diabetes Health  Diabetes Self-Management Education & Support Program  Encounter Note    SUMMARY  Diabetes self-care management training was completed related to reducing risks. The participant will return on December 12 to continue DSMES related to healthy eating and monitoring. The participant did not identify SMART Goal(s) and will practice knowledge and skills related to healthy eating and monitoring and healthy eating to improve diabetes self-management. EVALUATION:  Participant is doing very well with his management of diabetes. He is checking his blood sugars 4 times daily and his readings have been in target range. He has one below 70 mg/dL blood sugar and we discussed how to treat a low blood sugar before eating a meal. He is up to date for his vaccines and eye exams. He has scheduled in the new year his dental exam and foot exams. RECOMMENDATIONS:  Encouraged him to notify his provider when having below 70 blood sugars and treat his lows with 1/2 cup juice or soda, share his reading with his providers, and talk to his provider when needed about his diabetes management. TOPICS DISCUSSED TODAY:  WHAT IS DIABETES? Minutes: [place]? 30    Next provider visit is scheduled for 1/9/23 with Dr. Radha Mcmahon EVALUATION     12/2/2022 WHAT IS DIABETES? Role of the normal pancreas in energy balance and blood glucose control   The defect seen in diabetes   Signs & symptoms of diabetes   Diagnosis of diabetes   Types of diabetes   Blood glucose targets in non-pregnant & non-pregnant adults       The participant knows  Their type of diabetes: Yes  The basic physiologic defect: Yes  Blood glucose targets: Yes     DATE DSMES TOPIC EVALUATION     12/2/2022 HOW DO I STAY HEALTHY?    Prevention   Vaccinations   Preconception care (if applicable)  Examinations   Eye    Foot   Diabetic complications' prevention   Dental health   Heart health   [facility] health      The participant has a personal diabetes care record to keep abreast of diabetes health No     The participant needs to address start keeping a personal care record of his diabetes management. Pablo Fletcher RN on 12/2/2022 at 3:42 PM    I have personally reviewed the health record, including provider notes, laboratory data and current medications before making these care and education recommendations. The time spent in this effort is included in the total time.   Total minutes: 65

## 2022-12-12 ENCOUNTER — CLINICAL SUPPORT (OUTPATIENT)
Dept: DIABETES SERVICES | Age: 52
End: 2022-12-12
Payer: COMMERCIAL

## 2022-12-12 DIAGNOSIS — E11.65 TYPE 2 DIABETES MELLITUS WITH HYPERGLYCEMIA, WITHOUT LONG-TERM CURRENT USE OF INSULIN (HCC): Primary | ICD-10-CM

## 2022-12-12 PROCEDURE — G0108 DIAB MANAGE TRN  PER INDIV: HCPCS

## 2022-12-12 NOTE — PROGRESS NOTES
[facility]  Diabetes Self-Management Education & Support Program  Encounter Note    SUMMARY  Diabetes self-care management training was completed related to healthy eating and monitoring. The participant will return on December 21 to continue DSMES related to taking medications and physical activity. The participant did not identify SMART Goal(s) and will practice knowledge and skills related to healthy eating and monitoring and being active and medications to improve diabetes self-management. EVALUATION:  Participant states that he is doing well with his diabetes management. He is using the freestyle kimberly for monitoring his blood sugars. He says his fasting blood sugar this morning was 112 mg/dL. He sees lower blood sugars around 11 am before lunch time. His before lunch time blood sugars are ranged 69-80 mg/dL. He is engaged in some regular physical activity by walking three times a day around his building at work. RECOMMENDATIONS:  Encouraged him to notify his provider of any below 70 blood sugars, continue to monitor his carbohydrate portions of foods and beverages at meal times, and talk to his provider whenever needed about his diabetes management. TOPICS DISCUSSED TODAY:  WHAT CAN I EAT? 30  HOW CAN BLOOD GLUCOSE MONITORING HELP ME? 30    Next provider visit is scheduled for 1/9/23 with Dr. Magui Nickerson EVALUATION     12/12/2022 WHAT CAN I EAT? General principles   Determining a healthy weight   Nutritional terms & tools   Healthy Plate method   Carbohydrate Counting   Reading food labels   Free apps   Pregnancy recommendations      The participant   Uses Healthy Plate principles in constructing meals: Yes  Reads food labels in choosing acceptable foods: No    The participant needs to address starting to read his food labels and monitoring his carbohydrate amounts at meal times.      DATE DSMES TOPIC EVALUATION     12/12/2022 HOW CAN BLOOD GLUCOSE MONITORING HELP ME? Value of blood glucose monitoring   Realistic expectations   Blood glucose monitoring targets   Target adjustments   Setting a1c & blood glucose targets with provider   Meter selection    Technique for obtaining blood droplet   Blood glucose testing sites   Determining best times to test   Pregnancy recommendations   Data sharing with provider        The participant   Can demonstrate their glucometer procedure: Yes  Logs their BG readings:  Yes    The participant needs to address sharing his blood sugar readings with his providers. Edda Shipman RN on 12/12/2022 at 8:17 AM    I have personally reviewed the health record, including provider notes, laboratory data and current medications before making these care and education recommendations. The time spent in this effort is included in the total time.   Total minutes: 60

## 2023-01-09 ENCOUNTER — OFFICE VISIT (OUTPATIENT)
Dept: ENDOCRINOLOGY | Age: 53
End: 2023-01-09
Payer: COMMERCIAL

## 2023-01-09 VITALS
DIASTOLIC BLOOD PRESSURE: 80 MMHG | WEIGHT: 189.8 LBS | HEART RATE: 92 BPM | BODY MASS INDEX: 26.57 KG/M2 | HEIGHT: 71 IN | SYSTOLIC BLOOD PRESSURE: 133 MMHG

## 2023-01-09 DIAGNOSIS — R00.0 TACHYCARDIA: ICD-10-CM

## 2023-01-09 DIAGNOSIS — E78.5 DYSLIPIDEMIA: ICD-10-CM

## 2023-01-09 DIAGNOSIS — R80.9 MICROALBUMINURIA: ICD-10-CM

## 2023-01-09 DIAGNOSIS — E11.65 TYPE 2 DIABETES MELLITUS WITH HYPERGLYCEMIA, WITHOUT LONG-TERM CURRENT USE OF INSULIN (HCC): Primary | ICD-10-CM

## 2023-01-09 LAB — HBA1C MFR BLD HPLC: 7.1 %

## 2023-01-09 PROCEDURE — 99214 OFFICE O/P EST MOD 30 MIN: CPT | Performed by: GENERAL ACUTE CARE HOSPITAL

## 2023-01-09 PROCEDURE — 3051F HG A1C>EQUAL 7.0%<8.0%: CPT | Performed by: GENERAL ACUTE CARE HOSPITAL

## 2023-01-09 PROCEDURE — 83036 HEMOGLOBIN GLYCOSYLATED A1C: CPT | Performed by: GENERAL ACUTE CARE HOSPITAL

## 2023-01-09 RX ORDER — SEMAGLUTIDE 2.68 MG/ML
2 INJECTION, SOLUTION SUBCUTANEOUS
Qty: 3 ML | Refills: 5 | Status: SHIPPED | OUTPATIENT
Start: 2023-01-09

## 2023-01-09 NOTE — PROGRESS NOTES
REFERRED BY: Anselmo Murry MD     REASON:  Uncontrolled type 2 diabetes mellitus    CHIEF COMPLAINT: evaluation of type 2 diabetes mellitus    HISTORY OF PRESENT ILLNESS:   Tal Hollis is a 46 y.o. male with a PMHx as noted below who presents for evaluation of uncontrolled type 2 diabetes. Was following with PCP Dr Donnell Best      In the last visit we continued mr Valentine on glipizide 10mg BID, increased MTF to 1g BID and started Ozempic 0.5mg and now for 2 weeks he is on 1mg weekly. He feels better overall and has cut out the regular soda and not eating late night snacks. He is trying to be more active.      Diabetes History:  Diabetes was diagnosed: 2 years, was prediabetic before that 2-3 yrs  Family History of diabetes is Positive mother DM2  Last A1c: 12.9% 10/12/2022  7.1%  01/09/2023    Diabetes-related Hospitalizations: never    Current Home Regimen:  Metformin 1000mg BID  Glipizide SR 10 mg takes 2 tabs  Ozempic 1 mg weekly    Review of home glucose:  See scanned      Hypoglycemia: Yes, fingerstick reading 49 sometimes occurs after lunch and feels like he needs to eat a bigger meal, however he does not get hypoglycemia symptoms    Diet:  eats overnight cookie, donut  -3 meals  Breakfast = boiled eggs 2 slices toast, orange 8 oz with sugar, banana  Lunch = salad, stopped eating fried foods hamburger/cheese burgers for approx 4 weeks  Dinner = everything baked, chicken pork chops, spaghetti, rice, mac and cheese, potatoes, corn, string beans, yams  Snacks = does not snack, occass potato chips  Shauna = orange juice, regular sodas 16 oz x2 bottles per day    Physical Activity:  -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  -[employer] lifestyle, taking college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, 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    Complications:    MI or CVA:No  PVD: No  Retinopathy:No     Last Ophthalm:every year has appt on Oct, last 11/14/2022 was normal exam no retinopathy  Nephropathy:No  Neuropathy:No      Last Podiatry: 01/05/2023  Gastropathy: No  Amputations: No      On a Statin:Yes rosuvastatin 10mg daily  On an ACEI/ARB:Yes olmesartan 40 mg daily  On Aspirin:Yes  Smoker:No    Diabetes Education: Yes attended classes        Review of most recent diabetes-related labs:  Lab Results   Component Value Date    NFJ2AFQL 7.1 01/09/2023    PCF8EPHC 12.9 10/12/2022    CHOL 247 (H) 10/29/2022    LDLC 159 (H) 10/29/2022    GFRAA >60 12/17/2021    GFRNA 60 (L) 12/17/2021    MCACR 79 (H) 10/29/2022    TSH 1.330 12/10/2022     Lab Key:  [id number] = IA-2 pancreatic islet cell autoantibody  CPEPL = C-peptide level  :EXT = External Lab  GADLT = KELLEN-65 autoantibody   INSUL = Insulin level  MCACR (or MALBEXT) = Urine Microalbumin (or External UM)  B12LT = B12 level    PAST MEDICAL/SURGICAL HISTORY:   Past Medical History:   Diagnosis Date    HX OTHER MEDICAL     Hypertension     Other ill-defined conditions(799.89)     kidney stones     History reviewed. No pertinent surgical history. ALLERGIES:   No Known Allergies    MEDICATIONS ON ADMISSION:     Current Outpatient Medications:     semaglutide (Ozempic) 2 mg/dose (8 mg/3 mL) pnij, 2 mg by SubCUTAneous route every seven (7) days. , Disp: 3 mL, Rfl: 5    glipiZIDE SR (GLUCOTROL XL) 10 mg CR tablet, Take 2 Tablets by mouth daily. , Disp: 60 Tablet, Rfl: 3    FreeStyle Rudy 2 Sensor kit, Use as directed to test blood sugars at least 4 times daily.   Replace every 14 days  Dx: E11.65, Disp: 2 Kit, Rfl: 11    FreeStyle Rudy 2 [place] misc, Use as directed with freestyle rudy 2 sensors   Dx: E11.65, Disp: 1 Each, Rfl: 0    aspirin delayed-release 81 mg tablet, aspirin 81 mg tablet,delayed release  TAKE 1 TABLET BY MOUTH ONCE DAILY, Disp: , Rfl:     olmesartan (BENICAR) 40 mg tablet, olmesartan 40 mg tablet  Take 1 tablet every day by oral route., Disp: , Rfl:     rosuvastatin (CRESTOR) 10 mg tablet, nightly., Disp: , Rfl:     chlorthalidone (HYGROTON) 25 mg tablet, chlorthalidone 25 mg tablet  Take 1 tablet every day by oral route., Disp: , Rfl:     Blood-Glucose Meter monitoring kit, 1 preferred brand glucometer for checking home glucose E11.65, Disp: 1 Kit, Rfl: 0    lancets misc, Use preferred brand; Check glucose 2 times daily, Diagnosis E11.65, Disp: 1 Each, Rfl: 11    glucose blood VI test strips (Pharmacist Choice) strip, Use preferred brand; Check glucose 2 times daily, Diagnosis E11.65, Disp: 100 Strip, Rfl: 5    metFORMIN (GLUCOPHAGE) 1,000 mg tablet, Take 1 Tablet by mouth two (2) times daily (with meals). , Disp: 60 Tablet, Rfl: 5    albuterol (PROVENTIL HFA, VENTOLIN HFA, PROAIR HFA) 90 mcg/actuation inhaler, Take 1-2 Puffs by inhalation every four (4) hours as needed for Wheezing., Disp: 1 Inhaler, Rfl: 1    SOCIAL HISTORY:   Social History     Socioeconomic History    Marital status: SINGLE     Spouse name: Not on file    Number of children: Not on file    Years of education: Not on file    Highest education level: Not on file   Occupational History    Not on file   Tobacco Use    Smoking status: Former     Packs/day: 0.50     Years: 30.00     Pack years: 15.00     Types: Cigarettes    Smokeless tobacco: Never   Substance and Sexual Activity    Alcohol use: No    Drug use: Yes     Frequency: 7.0 times per week     Types: Heroin    Sexual activity: Yes     Partners: Female     Birth control/protection: Condom     Comment: Yesterday   Other Topics Concern    Not on file   Social History Narrative    Not on file     Social Determinants of Health     Financial Resource Strain: Not on file   Food Insecurity: Not on file   Transportation Needs: Not on file   Physical Activity: Not on file   Stress: Not on file   Social Connections: Not on file   Intimate Partner Violence: Not on file   Housing Stability: Not on file       FAMILY HISTORY:  History reviewed. No pertinent family history.     REVIEW OF SYSTEMS: Complete ROS assessed and noted for that which is described above, all else are negative. CONSTITUTIONAL: no fevers, chills, weight loss  EYES: no blurry vision or double vision  CARDIOVASCULAR: no chest pain or palpitations  RESPIRATORY: no cough or shortness of breath  GASTROINTESTINAL: no dysphagia or abdominal pain  MUSCULOSKELETAL: no joint pains or weakness  SKIN: no rashes  NEUROLOGICAL: no numbness, tingling, or headaches  PSYCHIATRIC: no depression or anxiety  ENDOCRINE: no heat or cold intolerance, no polyuria or polydipsia      PHYSICAL EXAMINATION:  VITAL SIGNS:  Visit Vitals  /80   Pulse 92   Ht 5' 11\" (1.803 m)   Wt 189 lb 12.8 oz (86.1 kg)   BMI 26.47 kg/m²       Last 3 Recorded Weights in this Encounter    01/09/23 1100   Weight: 189 lb 12.8 oz (86.1 kg)          GENERAL: NCAT, Sitting comfortably, NAD  EYES: EOMI, non-icteric, no proptosis  Ear/Nose/Throat: NCAT, no inflammation, no masses  LYMPH NODES: No LAD  CARDIOVASCULAR: S1 S2, RRR, No murmur, 2+ radial pulses  RESPIRATORY: CTA b/l, no wheeze/rales  GASTROINTESTINAL: NT, ND  MUSCULOSKELETAL: Normal ROM, no atrophy  SKIN: warm, no edema/rash/ or other skin changes  NEUROLOGIC: 5/5 power all extremities, no tremor, AAOx3  PSYCHIATRIC: Normal affect, Normal insight and judgement    Diabetic foot exam 10/2022:  bilateral   Visual Exam: normal    Pulse DP: 2+ (normal)   Filament test: normal sensation    Vibratory sensation: normal      REVIEW OF LABORATORY AND RADIOLOGY DATA:   Labs and documentation have been reviewed as described above. ASSESSMENT AND PLAN:   Sadie Tong is a 46 y.o. male with a PMHx as noted above who presents for evaluation of uncontrolled type 2 diabetes. Problems:  Type 2 diabetes Uncontrolled  Hyperlipidemia  Hypertension    Mr. Castillo Ramos has done a very good job of dietary control as well as medication compliance and is reflecting in his A1c improvement from 12.9 and to 7.1% in 3-month and he feels much better.   He has questionable hypoglycemia events in late afternoon and sometimes eats more to compensate for that. Plan to cut back on his glipizide and increase his Ozempic to reach target A1c less than 7%    PLAN  Type 2 Diabetes  A1c goal <7%, today 7.1%  Medications:   Metformin 1000mg twice daily (take with breakfast and dinner)  Glipizide Slow Release 10mg cut back from 2 to 1 tablet a day  Ozempic increase from 1mg to 2mg weekly after 4 weeks total     Measure fasting (first thing in the morning), before dinner and bedtime. Has upcoming appointment with cardiology and podiatry    HTN: BP stable on current medications, no changes today. Chlorthalidone 25mg daily has been taking for  6 months  On Olmesartan microalbumin 79    HLD: Fasting lipids reviewed, he is on rosuvastatin 10mg daily, elevated LDL likely related to poor diet and sugar control, will repeat and based on results will decide on further managementr  Lab Results   Component Value Date/Time    Cholesterol, total 247 (H) 10/29/2022 08:58 AM    HDL Cholesterol 49 10/29/2022 08:58 AM    LDL, calculated 159 (H) 10/29/2022 08:58 AM    VLDL, calculated 39 10/29/2022 08:58 AM    Triglyceride 211 (H) 10/29/2022 08:58 AM        Tachycardia  Underlying cause of tachycardia unclear, thyroid function test normal and plasma metanephrines were normal, he denies SOB, CP or dizziness/lightheadedness at this time, he has an upcoming appointment with the cardiologist at [facility] this month  Parameters to seek urgent/emergent care discussed     RTC 3 months    We discussed the expected course, resolution and complications of the diagnosis(es) in detail. Medication risks, benefits, costs, interactions, and alternatives were discussed as indicated. I advised Maria C Zabala to contact the office if him condition worsens, changes or fails to improve as anticipated. Patient expressed understanding with the diagnosis(es) and plan.             MD [facility]    Please see patient instructions.

## 2023-01-09 NOTE — PATIENT INSTRUCTIONS
PLAN FOR TODAY    We will plan to make the following changes to your diabetes medications:  Metformin 1000mg twice daily (take with breakfast and dinner)  Glipizide Slow Release 10mg one tablet in the morning  Ozempic increase from 1mg to 2mg weekly after 4 weeks total     Measure fasting (first thing in the morning), before dinner and bedtime.

## 2023-01-09 NOTE — LETTER
1/9/2023    Patient: Deleta Dandy   YOB: 1970   Date of Visit: 1/9/2023     Velma Pepper MD  [address] [place] [phone]  Via Fax: [phone]    Dear Velma Pepper MD,      Thank you for referring Mr. Deleta Dandy to [address] [place] [address] for evaluation. My notes for this consultation are attached. If you have questions, please do not hesitate to call me. I look forward to following your patient along with you.       Sincerely,    Jorge Villeda MD

## 2023-01-10 ENCOUNTER — CLINICAL SUPPORT (OUTPATIENT)
Dept: DIABETES SERVICES | Age: 53
End: 2023-01-10
Payer: COMMERCIAL

## 2023-01-10 DIAGNOSIS — E11.65 TYPE 2 DIABETES MELLITUS WITH HYPERGLYCEMIA, WITHOUT LONG-TERM CURRENT USE OF INSULIN (HCC): Primary | ICD-10-CM

## 2023-01-10 PROCEDURE — G0108 DIAB MANAGE TRN  PER INDIV: HCPCS

## 2023-01-10 NOTE — PROGRESS NOTES
[facility] Diabetes [facility]  Diabetes Self-Management Education & Support Program  Encounter Note    SUMMARY  Diabetes self-care management training was completed related to taking medications and physical activity. The participant will return on January 24 to continue DSMES related to healthy coping and problem solving. The participant did identify SMART Goal(s) and will practice knowledge and skills related to healthy eating and monitoring and being active and medications to improve diabetes self-management. EVALUATION:  Participant is doing very well with his diabetes management. He has improved his hemoglobin A1c. He is drinking diet beverages and water. He is decreasing the portions of carbohydrates at meal times. He is engaging in some regular physical activity. RECOMMENDATIONS:  Encouraged him to continue to check his blood sugars, monitor his carbohydrate portions at meal times, and talk to his provider when needed about his diabetes management. TOPICS DISCUSSED TODAY:  HOW DO MY DIABETES MEDICATIONS WORK? 30  HOW DOES PHYSICAL ACTIVITY AFFECT MY DIABETES? 30    Next provider visit is scheduled for 4/10/23 with Dr. Trey Ramos     SMART GOAL(S)  Increase physical activity by walking on the weekends for 30 minutes 1 times over the next week. ACHIEVEMENT OF GOAL(S) : 0-24%     DATE DSMES TOPIC EVALUATION     1/10/2023 HOW DO MY DIABETES MEDICATIONS WORK?    Type 1 medications & methods   Insulin injections   Injection sites   Type 2 medications   Oral agents   GLP-1 agonists   Hypoglycemia symptoms & treatment   Glucagon emergency kits   General guidance regarding insulin use whether Type 1, 2 or gestational diabetes   Storage of insulin   Disposal    Traveling with medications   Barriers to medication adherence      The participant   Can describe the expected action & side effects of prescribed diabetes medications: Yes  Can demonstrate injection technique (if applicable): Yes    The participant needs to address alternating his injection sites to decrease scar tissue from developing. DATE DSMES TOPIC EVALUATION     1/10/2023 HOW DOES PHYSICAL ACTIVITY AFFECT MY DIABETES? Benefits of physical activity   Beginning a program of physical activity   Walking   Pedometers   Goal setting   Structured physical activity program   Aerobic activity   Resistance   Flexibility   Balance   Physical activity program progression   Safety issues   Barriers to physical activity   Facilitators of physical activity        The participant has established a regular physical activity plan: Yes    The participant needs to address adding some physical activities on the weekends when off. Geovani Concepcion RN on 1/10/2023 at 3:49 PM    I have personally reviewed the health record, including provider notes, laboratory data and current medications before making these care and education recommendations. The time spent in this effort is included in the total time.   Total minutes: 60

## 2023-01-24 ENCOUNTER — CLINICAL SUPPORT (OUTPATIENT)
Dept: DIABETES SERVICES | Age: 53
End: 2023-01-24
Payer: COMMERCIAL

## 2023-01-24 DIAGNOSIS — E11.65 TYPE 2 DIABETES MELLITUS WITH HYPERGLYCEMIA, WITHOUT LONG-TERM CURRENT USE OF INSULIN (HCC): Primary | ICD-10-CM

## 2023-01-24 NOTE — PROGRESS NOTES
[facility]  Diabetes Self-Management Education & Support Program  Encounter Note    SUMMARY  Diabetes self-care management training was completed related to healthy coping and problem solving. The participant will return on March 7 for his 6 week diabetes follow up appointment. The participant did not identify SMART Goal(s) and will practice knowledge and skills related to healthy eating and monitoring and being active and medications to improve diabetes self-management. EVALUATION:  Participant is doing better with his diabetes management. He states he is still seeing some below 70 mg/dL blood sugars. He is eating his lunch at late times which could be the reason for his mid day lows. He was advised to talk to his provider to discuss these low blood sugars. He enjoys reading, walking and meditation to relax. RECOMMENDATIONS:  Encouraged him to not skip any meals. Eat every 4-5 hours and limit the snacking between meals. Take his medications as prescribed daily. TOPICS DISCUSSED TODAY:  HOW DO I FIND SUPPORT TO TACKLE THIS CONDITION? 30  HOW DO I FIGURE OUT WHAT'S INFLUENCING MY BLOOD GLUCOSES? 30    Next provider visit is scheduled for 4/10/23 with Dr. Dorina Chairez     SMART GOAL(S)  Increase physical activity by walking on the weekends for 30 minutes 1 times over the next week. ACHIEVEMENT OF GOAL(S) : 0-24%  ACHIEVEMENT OF GOAL(S) : %     DATE DSMES TOPIC EVALUATION     1/24/2023 HOW DO I FIND SUPPORT TO TACKLE THIS CONDITION?    Normal responses to diabetes diagnosis or complication   Shock   Anger & resentment   Guilt/self-blame   Sadness & worry   Depression    Anxiety   Pregnancy   Constructive strategies to normal responses    Exploring feelings & attitudes   Motivation: Cost versus benefits analysis   Problem-solving: Chain analysis   Obtaining support: Communicating   Family & friends   Health team   Community   Stress   Symptoms   Managing stress   Fill your tool kit The participant can identify people that support them in caring for their diabetes health: patient and spouse      The participant would like to pursue the following in keeping themselves healthy after completing the program:  ADA wed site (food hub)  The participant needs to address asking for help whenever it is needed. DATE DSMES TOPIC EVALUATION     1/24/2023 HOW DO I FIGURE OUT WHAT'S INFLUENCING MY BLOOD GLUCOSES? Problem solving using SOAR   Set goals   Sort options   Arrive at a plan   Reaffirm plan   Common problems in diabetes self-care   Hypoglycemia   Hyperglycemia   Sick days   Pattern management   Disaster preparedness plan        The participant has an action plan for   Hypoglycemia: Yes  Hyperglycemia: Yes  Sick days: No  During disasters: No    The participant needs to address developing a plan for sick days and disasters for emergencies. Stevan Oviedo RN on 1/24/2023 at 3:44 PM    I have personally reviewed the health record, including provider notes, laboratory data and current medications before making these care and education recommendations. The time spent in this effort is included in the total time.   Total minutes: 60

## 2023-03-07 ENCOUNTER — CLINICAL SUPPORT (OUTPATIENT)
Dept: DIABETES SERVICES | Age: 53
End: 2023-03-07

## 2023-03-07 DIAGNOSIS — E11.65 TYPE 2 DIABETES MELLITUS WITH HYPERGLYCEMIA, WITHOUT LONG-TERM CURRENT USE OF INSULIN (HCC): Primary | ICD-10-CM

## 2023-03-07 NOTE — PROGRESS NOTES
[facility] for Diabetes Health  Diabetes Self-Management Education & Support Program  Post-program Evaluation    EVALUATION @ COMPLETION OF THE PROGRAM    Jorden Pollack completed 5 hours of diabetes self-management education. The participant acquired new knowledge and demonstrated new skills during the program.     The participant worked on the following SMART GOAL(s) to improve their diabetes self-management:    Increase physical activity by walking on the weekends for 30 minutes 1 times over the next week. ACHIEVEMENT OF GOAL(S) : %    The participant's pre-program A1c was 12.9 on 10/12/2022    . The post-evaluation A1c is 7.1. Lab Results   Component Value Date/Time    Hemoglobin A1c (POC) 7.1 01/09/2023 11:21 AM       The participant improved their Diabetes Skills, Confidence and Preparedness Index (scored out of 7): Total score: 6.4  Skills: 6.4  Confidence: 6.1  Preparedness: 6.7    FINAL RECOMMENDATIONS:  Participant states that he is doing well with his diabetes management. He is eating 2 healthy meals daily, skipping lunch on most days. He is monitoring his blood sugars and has readings in the target range. He is engage in regular physical activity daily. Next provider visit is scheduled for 4/10/23 with Dr. Gissel Brewster, RN on 3/7/2023     Metric Patient's responses (3/7/2023) Areas for improvement     Healthy Eating       The participant is using the Healthy Plate to control carbohydrate intake - Yes    The participant reads food labels accurately - Yes          Being Active       The participant performs physical activity where your heart beats faster and your breathing is harder than normal for 30 minutes or more? 5 day(s)     In a typical week, the participant performs physical activity 5 day(s)          Monitoring   The participant is monitoring their blood sugars?  Yes    The participant is monitoring 1x/day    Blood sugar ranges are  mg/dL    The participant improved their A1c - Yes    The participant understands the meaning of the A1c - Yes          Taking Medications   The participant understands what their diabetes medications do Yes    The participant can afford your diabetes medications Yes    The participant does not miss doses of their diabetes medications - never          Healthy Coping     The participant feels supported by family, friends and others related to their diabetes self-care practices - Yes    The participant plans on utilizing the following community resources after completion of the program: ADA wed site (food hub)        Reducing Risks   Vaccines:  Influenza: There is no immunization history for the selected administration types on file for this patient. Pneumococcal: There is no immunization history for the selected administration types on file for this patient. Hepatitis: There is no immunization history for the selected administration types on file for this patient.     Examinations:  Diabetic Foot and Eye Exam HM Status   Topic Date Due    Diabetic Foot Care  Never done      Dental exam: last appointment was: 12/2022    Foot exam: done on: 3/7/2023    Heart Protection:  BP Readings from Last 2 Encounters:   01/09/23 133/80   11/09/22 (!) 136/93        Lab Results   Component Value Date/Time    LDL, calculated 159 (H) 10/29/2022 08:58 AM        Key Anti-Platelet Anticoagulant Meds               aspirin delayed-release 81 mg tablet aspirin 81 mg tablet,delayed release   TAKE 1 TABLET BY MOUTH ONCE DAILY             Kidney Protection:  Lab Results   Component Value Date/Time    Microalb/Creat ratio (ug/mg creat.) 79 (H) 10/29/2022 08:58 AM      The participant would benefit from additional attention to:    Vaccines:  [] Influenza  [] Pneumococcal  [x] Hepatitis    Examinations:  [] Dilated eye exam  [] Dental exam  [] Foot exam    Other:  [] Reviewing long-term complications     Problem Solving   Hypoglycemia Management:  The participant knows the signs and symptoms of hypoglycemia: Weakness    The participant knows how to prevent hypoglycemia: consistent meals/snack time and take medications as instructed    The participant knows how to treat hypoglycemia: Rule of 15    Hyperglycemia Management:  The participant knows their signs and symptoms of hyperglycemia: Extreme thirst     The participant knows how to prevent hyperglycemia: take medications as instructed, focus on carbohydrate counting/meal planning, and engage in regular physical activity    Sick Day Management:  The participant knows what to do differently on sick days: Stay hydrated, Check blood glucose every 2-4 hours, Take diabetes medication as instructed by provider, Take over-the-counter medications as instructed by provider     Pattern Management:  The participant can notice blood glucose patterns when looking at their blood glucose readings: Yes    How do you use the blood glucose readings from your meter or logbook: understand how body responds to meals      Note: Content derived from the American Association of Diabetes Educators' Diabetes Education Curriculum: A Guide to Successful Self-Management (3rd edition)      I have personally reviewed the health record, including provider notes, laboratory data and current medications before making these care and education recommendations. The time spent in this effort is included in the total time. Total minutes: 30    Diabetes Skills, Confidence & Preparedness Index (SCPI) ©  Thank you for completing the Skills, Confidence & Preparedness Index! Below are your scores. All scales and questions are out of 7. If you would like these results emailed, please enter your email address along with some identifying patient information.   Email:    Patient Identifier:        Overall SCPI score: 6.4 Skills Score: 6.4  Low: Healthy Eating(Q1),Blood Sugar Monitoring(Q4),Problem Solving(Q6),Healthy Coping(Q7),Blood Sugar Monitoring(Q8) Confidence Score: 6.1  Low: Healthy Eating(Q1),Healthy Coping(Q2),Reducing Risks(Q3),Healthy Eating(Q4),Blood Sugar Monitoring(Q6),Problem [id number]) Preparedness Score: 6.7  Low: Healthy Coping(Q3),Reducing Risks(Q4)  Healthy Eating Score: 6.3  Low: Skills(Q1),Confidence(Q1),Confidence(Q4) Taking Medication Score: 7.0  Low: Skills(Q2) Blood Sugar Monitoring Score: 6.4  Low: Skills(Q4),Skills(Q8),Confidence(Q6) Reducing Risks Score: 6.5  Low: Confidence(Q3),Preparedness(Q4)  Problem Solving Score: 6.3  Low: Skills(Q6),Confidence(Q7) Healthy Coping Score: 6.0  Low: Skills(Q7),Confidence(Q2),Preparedness(Q3) Being Active Score: 7.0  Low: Confidence(Q5),Preparedness(Q2)    Skills/Knowledge Questions  1. I know how to plan meals that have the best balance between carbohydrates, proteins and vegetables. 6  2. I know how my diabetes medications (pills, injectables and/or insulin) work in my body. 7  3. I know when to check my blood sugar if I want to see how my body responded to a meal. 7  4. I know when to check my blood sugars to determine if my medication or insulin doses are correct. 6  5. I know what to do to prevent a low blood sugar when I exercise (either before, during, or after). 7  6. When I am sick, I know what to do differently with my diabetes management. 6  7. I know how stress can affect my diabetes management. 6  8. When I look at my blood sugars over a given week, I can explain what my blood sugar pattern is. 6  9. I know what my target levels are for A1c, blood pressure and cholesterol. 7  Confidence Questions  1. I am confident that I can plan balanced meals and snacks. 6  2. I am confident that I can manage my stress. 6  3. I am confident that I can prevent a low blood sugar during or after exercise. 6  4. I am confident that the next time I eat out, I will be able to choose foods that best keep my blood sugars in target. 6  5. I am confident I can include exercise into my schedule. 7  6.  I am confident that I can use my daily blood sugars to adjust my diet, my activity, and/or my insulin. 6  7. When something out of my normal routine happens, I am confident that I can problem-solve and keep my diabetes on track. 6  Preparedness Questions  1. Within the next month, I will begin to eat more balanced meals and snacks. 8  2. Within the next month, I will choose an exercise activity and I will start fitting it into my schedule. 8  3. Within the next month, I will make a list of stress management options that work for me. 6  4. Within the next month, I will consistently plan ahead to prevent low blood sugars. 6  5. Within the next month, I will start adjusting my insulin doses on my own. 0  6. Within the next month, I will begin making changes to my diabetes management based on my daily blood sugars (eg - eating, activity and/or insulin). 8  7. Within the next month, I will begin making changes to my diabetes management to meet my overall goals (eg - eating, activity and/or insulin).  8

## 2023-07-09 LAB
ALBUMIN SERPL-MCNC: 5.3 G/DL (ref 3.8–4.9)
ALBUMIN/CREAT UR: 19 MG/G CREAT (ref 0–29)
ALBUMIN/GLOB SERPL: 1.4 {RATIO} (ref 1.2–2.2)
ALP SERPL-CCNC: 135 IU/L (ref 44–121)
ALT SERPL-CCNC: 13 IU/L (ref 0–44)
AST SERPL-CCNC: 10 IU/L (ref 0–40)
BILIRUB SERPL-MCNC: 0.3 MG/DL (ref 0–1.2)
BUN SERPL-MCNC: 28 MG/DL (ref 6–24)
BUN/CREAT SERPL: 19 (ref 9–20)
CALCIUM SERPL-MCNC: 10 MG/DL (ref 8.7–10.2)
CHLORIDE SERPL-SCNC: 94 MMOL/L (ref 96–106)
CHOLEST SERPL-MCNC: 109 MG/DL (ref 100–199)
CO2 SERPL-SCNC: 18 MMOL/L (ref 20–29)
CREAT SERPL-MCNC: 1.46 MG/DL (ref 0.76–1.27)
CREAT UR-MCNC: 53.7 MG/DL
EGFRCR SERPLBLD CKD-EPI 2021: 58 ML/MIN/1.73
GLOBULIN SER CALC-MCNC: 3.7 G/DL (ref 1.5–4.5)
GLUCOSE SERPL-MCNC: 119 MG/DL (ref 70–99)
HDLC SERPL-MCNC: 54 MG/DL
LDLC SERPL CALC-MCNC: 29 MG/DL (ref 0–99)
MICROALBUMIN UR-MCNC: 10 UG/ML
POTASSIUM SERPL-SCNC: 5 MMOL/L (ref 3.5–5.2)
PROT SERPL-MCNC: 9 G/DL (ref 6–8.5)
SODIUM SERPL-SCNC: 133 MMOL/L (ref 134–144)
TRIGL SERPL-MCNC: 156 MG/DL (ref 0–149)
VLDLC SERPL CALC-MCNC: 26 MG/DL (ref 5–40)

## 2023-07-10 LAB
EST. AVERAGE GLUCOSE BLD GHB EST-MCNC: 163 MG/DL
HBA1C MFR BLD: 7.3 % (ref 4.8–5.6)
IMP & REVIEW OF LAB RESULTS: NORMAL
REPORT: NORMAL

## 2024-01-16 ENCOUNTER — TELEMEDICINE (OUTPATIENT)
Age: 54
End: 2024-01-16
Payer: COMMERCIAL

## 2024-01-16 DIAGNOSIS — E11.29 TYPE 2 DIABETES MELLITUS WITH MICROALBUMINURIA, WITHOUT LONG-TERM CURRENT USE OF INSULIN (HCC): Primary | ICD-10-CM

## 2024-01-16 DIAGNOSIS — E78.2 MIXED HYPERLIPIDEMIA: ICD-10-CM

## 2024-01-16 DIAGNOSIS — E11.29 TYPE 2 DIABETES MELLITUS WITH MICROALBUMINURIA, WITHOUT LONG-TERM CURRENT USE OF INSULIN (HCC): ICD-10-CM

## 2024-01-16 DIAGNOSIS — R80.9 TYPE 2 DIABETES MELLITUS WITH MICROALBUMINURIA, WITHOUT LONG-TERM CURRENT USE OF INSULIN (HCC): ICD-10-CM

## 2024-01-16 DIAGNOSIS — E66.3 OVERWEIGHT: ICD-10-CM

## 2024-01-16 DIAGNOSIS — R80.9 TYPE 2 DIABETES MELLITUS WITH MICROALBUMINURIA, WITHOUT LONG-TERM CURRENT USE OF INSULIN (HCC): Primary | ICD-10-CM

## 2024-01-16 PROCEDURE — 99214 OFFICE O/P EST MOD 30 MIN: CPT | Performed by: GENERAL ACUTE CARE HOSPITAL

## 2024-01-16 RX ORDER — ROSUVASTATIN CALCIUM 5 MG/1
TABLET, COATED ORAL
Qty: 30 TABLET | Refills: 5 | Status: SHIPPED | OUTPATIENT
Start: 2024-01-16

## 2024-01-16 RX ORDER — GLIPIZIDE 10 MG/1
10 TABLET, FILM COATED, EXTENDED RELEASE ORAL DAILY
Qty: 30 TABLET | Refills: 5 | Status: SHIPPED | OUTPATIENT
Start: 2024-01-16

## 2024-01-16 NOTE — PROGRESS NOTES
fill the new prescription)    See with  insurance if Ozempic is till covered and look into [employer] patient assistance program for financial support to get the medication.      Obtain labs not fasting at your earliest convenience.     Continue to check blood sugar 1 time daily and record it.    Measure fasting (first thing in the morning)   Has upcoming appointment with cardiology and podiatry     HTN: telehealth visit, on Chlorthalidone 25mg daily has been taking for  6 months   On Olmesartan microalbumin 79 >>> 19  Lab Results   Component Value Date    LABCREA 53.7 07/08/2023    LABCREA 79.8 10/29/2022    LABALBU 5.3 (H) 07/08/2023    LABALBU 10.0 07/08/2023    LABALBU 5.0 (H) 10/29/2022    MALBCR 19 07/08/2023    MALBCR 79 (H) 10/29/2022            Repeat labs before next visit   Hyperlipidemia : Fasting lipids reviewed, he is on rosuvastatin 10mg daily, LDL very well controlled, to reduce rosuvastatin from 10 to 5mg dose  Lab Results   Component Value Date/Time    CHOL 109 07/08/2023 09:30 AM    CHOL 247 10/29/2022 08:58 AM    TRIG 156 07/08/2023 09:30 AM    LDLCALC 29 07/08/2023 09:30 AM    HDL 54 07/08/2023 09:30 AM    LABVLDL 26 07/08/2023 09:30 AM      Overweight: gained 30 lbs, will try to get back on GLP-1 agonist if affordable, discussed lifestyle modif      RTC 3 months with prelabs       Please note that this dictation was completed with [employer], the [employer] voice recognition software.  Quite often unanticipated grammatical, syntax, homophones, and other interpretive errors are inadvertently transcribed by the computer software.  Efforts  were made to correct these errors in proofreading. Please excuse any errors that have escaped final proofreading.  Thank you.       MD L[facility]      Please see patient instructions.     There are no Patient Instructions on file for this visit.

## 2024-01-24 ENCOUNTER — TELEPHONE (OUTPATIENT)
Age: 54
End: 2024-01-24

## 2024-01-24 NOTE — TELEPHONE ENCOUNTER
Patient left a voice message requesting to speak with Dr. Rodriguez.  He stated that the Jardiance 25mg is to expensive and he cannot afford it.  He would like to discuss his options.

## 2024-01-25 NOTE — TELEPHONE ENCOUNTER
Patient stated that he did check with his insurance regarding the Ozempic and he was told that he has a $2,000 deductible. He also stated that he did call [employer] and left a message for them to call him.  He will reach out to them again.

## 2024-01-25 NOTE — TELEPHONE ENCOUNTER
During the last appointment on 01/16/2024 I asked mr Hinojosa if Ozempic was still covered by his insurance and he stated he will look into to it, kindly see if he has done that       From office notes:    Medications:       Continue:   Metformin 1000mg twice daily (take with breakfast and dinner)   Glipizide Slow Release 10mg take 1 tablet a day  Jardiance 25mg daily (can take 2 of the 10mg dose til fill the new prescription)     See with  insurance if Ozempic is till covered and look into [employer] patient assistance program for financial support to get the medication.

## 2024-02-21 NOTE — TELEPHONE ENCOUNTER
Spoke with mr Micaela, indicates his blood sugar numbers have been doing well, and plan for him to continue the current medications and we will re-evaluate in the next visit to start him on Ozempic, he feels he will meet the income criteria for patient assistance program as it has been previously been costly, will advise in the upcoming visit, patient indicates understanding and agrees with plan.

## 2024-04-07 LAB
ALBUMIN SERPL-MCNC: 4.7 G/DL (ref 3.8–4.9)
ALBUMIN/GLOB SERPL: 1.4 {RATIO} (ref 1.2–2.2)
ALP SERPL-CCNC: 124 IU/L (ref 44–121)
ALT SERPL-CCNC: 13 IU/L (ref 0–44)
AST SERPL-CCNC: 10 IU/L (ref 0–40)
BILIRUB SERPL-MCNC: 0.3 MG/DL (ref 0–1.2)
BUN SERPL-MCNC: 26 MG/DL (ref 6–24)
BUN/CREAT SERPL: 18 (ref 9–20)
CALCIUM SERPL-MCNC: 10.1 MG/DL (ref 8.7–10.2)
CHLORIDE SERPL-SCNC: 104 MMOL/L (ref 96–106)
CO2 SERPL-SCNC: 17 MMOL/L (ref 20–29)
CREAT SERPL-MCNC: 1.45 MG/DL (ref 0.76–1.27)
EGFRCR SERPLBLD CKD-EPI 2021: 58 ML/MIN/1.73
GLOBULIN SER CALC-MCNC: 3.3 G/DL (ref 1.5–4.5)
GLUCOSE SERPL-MCNC: 100 MG/DL (ref 70–99)
POTASSIUM SERPL-SCNC: 5.1 MMOL/L (ref 3.5–5.2)
PROT SERPL-MCNC: 8 G/DL (ref 6–8.5)
SODIUM SERPL-SCNC: 138 MMOL/L (ref 134–144)
TSH SERPL DL<=0.005 MIU/L-ACNC: 2.07 UIU/ML (ref 0.45–4.5)

## 2024-04-08 LAB
HBA1C MFR BLD: 8.3 % (ref 4.8–5.6)
Lab: NORMAL
REPORT: NORMAL

## 2024-04-18 RX ORDER — EMPAGLIFLOZIN 10 MG/1
10 TABLET, FILM COATED ORAL DAILY
Qty: 90 TABLET | Refills: 3 | Status: SHIPPED | OUTPATIENT
Start: 2024-04-18

## 2024-04-30 RX ORDER — GLIPIZIDE 10 MG/1
20 TABLET, FILM COATED, EXTENDED RELEASE ORAL DAILY
Qty: 180 TABLET | Refills: 1 | Status: SHIPPED | OUTPATIENT
Start: 2024-04-30

## 2024-06-11 PROBLEM — N18.31 TYPE 2 DIABETES MELLITUS WITH STAGE 3A CHRONIC KIDNEY DISEASE, WITHOUT LONG-TERM CURRENT USE OF INSULIN (HCC): Status: ACTIVE | Noted: 2024-01-16

## 2024-06-11 PROBLEM — E11.22 TYPE 2 DIABETES MELLITUS WITH STAGE 3A CHRONIC KIDNEY DISEASE, WITHOUT LONG-TERM CURRENT USE OF INSULIN (HCC): Status: ACTIVE | Noted: 2024-01-16

## 2024-07-31 RX ORDER — ROSUVASTATIN CALCIUM 5 MG/1
TABLET, COATED ORAL
Qty: 90 TABLET | Refills: 2 | Status: SHIPPED | OUTPATIENT
Start: 2024-07-31

## 2024-11-23 DIAGNOSIS — R80.9 TYPE 2 DIABETES MELLITUS WITH MICROALBUMINURIA, WITHOUT LONG-TERM CURRENT USE OF INSULIN (HCC): Primary | ICD-10-CM

## 2024-11-23 DIAGNOSIS — E11.29 TYPE 2 DIABETES MELLITUS WITH MICROALBUMINURIA, WITHOUT LONG-TERM CURRENT USE OF INSULIN (HCC): Primary | ICD-10-CM

## 2024-11-25 RX ORDER — GLIPIZIDE 10 MG/1
20 TABLET, FILM COATED, EXTENDED RELEASE ORAL DAILY
Qty: 180 TABLET | Refills: 0 | Status: SHIPPED | OUTPATIENT
Start: 2024-11-25

## 2025-02-14 DIAGNOSIS — E11.29 TYPE 2 DIABETES MELLITUS WITH MICROALBUMINURIA, WITHOUT LONG-TERM CURRENT USE OF INSULIN (HCC): Primary | ICD-10-CM

## 2025-02-14 DIAGNOSIS — R80.9 TYPE 2 DIABETES MELLITUS WITH MICROALBUMINURIA, WITHOUT LONG-TERM CURRENT USE OF INSULIN (HCC): Primary | ICD-10-CM

## 2025-03-01 DIAGNOSIS — R80.9 TYPE 2 DIABETES MELLITUS WITH MICROALBUMINURIA, WITHOUT LONG-TERM CURRENT USE OF INSULIN (HCC): ICD-10-CM

## 2025-03-01 DIAGNOSIS — E11.29 TYPE 2 DIABETES MELLITUS WITH MICROALBUMINURIA, WITHOUT LONG-TERM CURRENT USE OF INSULIN (HCC): ICD-10-CM

## 2025-03-02 RX ORDER — GLIPIZIDE 10 MG/1
20 TABLET, FILM COATED, EXTENDED RELEASE ORAL DAILY
Qty: 180 TABLET | Refills: 1 | Status: SHIPPED | OUTPATIENT
Start: 2025-03-02

## 2025-04-21 RX ORDER — ROSUVASTATIN CALCIUM 5 MG/1
TABLET, COATED ORAL
Qty: 90 TABLET | Refills: 0 | OUTPATIENT
Start: 2025-04-21

## 2025-05-14 DIAGNOSIS — R80.9 TYPE 2 DIABETES MELLITUS WITH MICROALBUMINURIA, WITHOUT LONG-TERM CURRENT USE OF INSULIN (HCC): ICD-10-CM

## 2025-05-14 DIAGNOSIS — E11.29 TYPE 2 DIABETES MELLITUS WITH MICROALBUMINURIA, WITHOUT LONG-TERM CURRENT USE OF INSULIN (HCC): ICD-10-CM
